# Patient Record
Sex: FEMALE | Race: ASIAN | NOT HISPANIC OR LATINO
[De-identification: names, ages, dates, MRNs, and addresses within clinical notes are randomized per-mention and may not be internally consistent; named-entity substitution may affect disease eponyms.]

---

## 2017-05-09 PROBLEM — Z00.00 ENCOUNTER FOR PREVENTIVE HEALTH EXAMINATION: Status: ACTIVE | Noted: 2017-05-09

## 2017-05-16 ENCOUNTER — APPOINTMENT (OUTPATIENT)
Dept: OBGYN | Facility: CLINIC | Age: 27
End: 2017-05-16

## 2017-05-25 ENCOUNTER — APPOINTMENT (OUTPATIENT)
Dept: INTERNAL MEDICINE | Facility: CLINIC | Age: 27
End: 2017-05-25

## 2021-04-22 ENCOUNTER — LABORATORY RESULT (OUTPATIENT)
Age: 31
End: 2021-04-22

## 2021-04-22 ENCOUNTER — NON-APPOINTMENT (OUTPATIENT)
Age: 31
End: 2021-04-22

## 2021-04-22 ENCOUNTER — ASOB RESULT (OUTPATIENT)
Age: 31
End: 2021-04-22

## 2021-04-22 ENCOUNTER — APPOINTMENT (OUTPATIENT)
Dept: OBGYN | Facility: CLINIC | Age: 31
End: 2021-04-22
Payer: COMMERCIAL

## 2021-04-22 VITALS
BODY MASS INDEX: 19.72 KG/M2 | HEIGHT: 66 IN | SYSTOLIC BLOOD PRESSURE: 118 MMHG | WEIGHT: 122.7 LBS | HEART RATE: 87 BPM | DIASTOLIC BLOOD PRESSURE: 73 MMHG | TEMPERATURE: 98.7 F

## 2021-04-22 DIAGNOSIS — Z80.8 FAMILY HISTORY OF MALIGNANT NEOPLASM OF OTHER ORGANS OR SYSTEMS: ICD-10-CM

## 2021-04-22 DIAGNOSIS — Z78.9 OTHER SPECIFIED HEALTH STATUS: ICD-10-CM

## 2021-04-22 DIAGNOSIS — Z80.1 FAMILY HISTORY OF MALIGNANT NEOPLASM OF TRACHEA, BRONCHUS AND LUNG: ICD-10-CM

## 2021-04-22 DIAGNOSIS — Z87.898 PERSONAL HISTORY OF OTHER SPECIFIED CONDITIONS: ICD-10-CM

## 2021-04-22 PROCEDURE — 99202 OFFICE O/P NEW SF 15 MIN: CPT

## 2021-04-22 PROCEDURE — 99072 ADDL SUPL MATRL&STAF TM PHE: CPT

## 2021-04-22 PROCEDURE — 0501F PRENATAL FLOW SHEET: CPT

## 2021-04-22 PROCEDURE — 76801 OB US < 14 WKS SINGLE FETUS: CPT

## 2021-04-28 ENCOUNTER — LABORATORY RESULT (OUTPATIENT)
Age: 31
End: 2021-04-28

## 2021-04-28 ENCOUNTER — ASOB RESULT (OUTPATIENT)
Age: 31
End: 2021-04-28

## 2021-04-28 ENCOUNTER — APPOINTMENT (OUTPATIENT)
Dept: ANTEPARTUM | Facility: CLINIC | Age: 31
End: 2021-04-28
Payer: COMMERCIAL

## 2021-04-28 PROCEDURE — 36416 COLLJ CAPILLARY BLOOD SPEC: CPT

## 2021-04-28 PROCEDURE — 76801 OB US < 14 WKS SINGLE FETUS: CPT

## 2021-04-28 PROCEDURE — 99072 ADDL SUPL MATRL&STAF TM PHE: CPT

## 2021-04-28 PROCEDURE — 76813 OB US NUCHAL MEAS 1 GEST: CPT | Mod: 59

## 2021-05-03 ENCOUNTER — NON-APPOINTMENT (OUTPATIENT)
Age: 31
End: 2021-05-03

## 2021-05-04 ENCOUNTER — APPOINTMENT (OUTPATIENT)
Dept: OBGYN | Facility: CLINIC | Age: 31
End: 2021-05-04
Payer: COMMERCIAL

## 2021-05-04 PROCEDURE — 76801 OB US < 14 WKS SINGLE FETUS: CPT

## 2021-05-04 PROCEDURE — 99072 ADDL SUPL MATRL&STAF TM PHE: CPT

## 2021-05-07 ENCOUNTER — NON-APPOINTMENT (OUTPATIENT)
Age: 31
End: 2021-05-07

## 2021-05-13 ENCOUNTER — NON-APPOINTMENT (OUTPATIENT)
Age: 31
End: 2021-05-13

## 2021-05-24 ENCOUNTER — NON-APPOINTMENT (OUTPATIENT)
Age: 31
End: 2021-05-24

## 2021-05-24 ENCOUNTER — APPOINTMENT (OUTPATIENT)
Dept: OBGYN | Facility: CLINIC | Age: 31
End: 2021-05-24
Payer: COMMERCIAL

## 2021-05-24 ENCOUNTER — ASOB RESULT (OUTPATIENT)
Age: 31
End: 2021-05-24

## 2021-05-24 VITALS
SYSTOLIC BLOOD PRESSURE: 99 MMHG | DIASTOLIC BLOOD PRESSURE: 63 MMHG | WEIGHT: 125 LBS | HEIGHT: 66 IN | TEMPERATURE: 97.3 F | BODY MASS INDEX: 20.09 KG/M2

## 2021-05-24 PROCEDURE — 76817 TRANSVAGINAL US OBSTETRIC: CPT

## 2021-05-24 PROCEDURE — 0502F SUBSEQUENT PRENATAL CARE: CPT

## 2021-05-24 PROCEDURE — 76815 OB US LIMITED FETUS(S): CPT

## 2021-05-28 ENCOUNTER — NON-APPOINTMENT (OUTPATIENT)
Age: 31
End: 2021-05-28

## 2021-05-28 PROBLEM — Z80.8 FAMILY HISTORY OF MALIGNANT NEOPLASM OF THYROID: Status: ACTIVE | Noted: 2021-05-28

## 2021-05-28 PROBLEM — Z80.1 FAMILY HISTORY OF LUNG CANCER: Status: ACTIVE | Noted: 2021-05-28

## 2021-05-28 PROBLEM — Z87.898 HISTORY OF HEADACHE: Status: RESOLVED | Noted: 2021-05-28 | Resolved: 2021-05-28

## 2021-05-28 LAB
2ND TRIMESTER DATA: NORMAL
ABO + RH PNL BLD: NORMAL
AFP PNL SERPL: NORMAL
AFP SERPL-ACNC: NORMAL
AR GENE MUT ANL BLD/T: NORMAL
B19V IGG SER QL IA: 0.28 INDEX
B19V IGG+IGM SER-IMP: NEGATIVE
B19V IGG+IGM SER-IMP: NORMAL
B19V IGM FLD-ACNC: 0.15 INDEX
B19V IGM SER-ACNC: NEGATIVE
BACTERIA UR CULT: NORMAL
BASOPHILS # BLD AUTO: 0.07 K/UL
BASOPHILS NFR BLD AUTO: 0.7 %
BLD GP AB SCN SERPL QL: NORMAL
CLINICAL BIOCHEMIST REVIEW: NORMAL
COVID-19 NUCLEOCAPSID  GAM ANTIBODY INTERPRETATION: NEGATIVE
EOSINOPHIL # BLD AUTO: 0.18 K/UL
EOSINOPHIL NFR BLD AUTO: 1.8 %
FMR1 GENE MUT ANL BLD/T: NORMAL
HBV SURFACE AG SER QL: NONREACTIVE
HCT VFR BLD CALC: 35.3 %
HCV AB SER QL: NONREACTIVE
HCV S/CO RATIO: 0.14 S/CO
HGB A MFR BLD: 97.7 %
HGB A2 MFR BLD: 2.3 %
HGB BLD-MCNC: 11.5 G/DL
HGB FRACT BLD-IMP: NORMAL
HIV1+2 AB SPEC QL IA.RAPID: NONREACTIVE
IMM GRANULOCYTES NFR BLD AUTO: 0.4 %
LEAD BLD-MCNC: <1 UG/DL
LYMPHOCYTES # BLD AUTO: 1.88 K/UL
LYMPHOCYTES NFR BLD AUTO: 18.3 %
MAN DIFF?: NORMAL
MCHC RBC-ENTMCNC: 26 PG
MCHC RBC-ENTMCNC: 32.6 GM/DL
MCV RBC AUTO: 79.9 FL
MEV IGG FLD QL IA: 36.9 AU/ML
MEV IGG+IGM SER-IMP: POSITIVE
MONOCYTES # BLD AUTO: 0.54 K/UL
MONOCYTES NFR BLD AUTO: 5.3 %
NEUTROPHILS # BLD AUTO: 7.56 K/UL
NEUTROPHILS NFR BLD AUTO: 73.5 %
NOTES NTD: NORMAL
PLATELET # BLD AUTO: 248 K/UL
RBC # BLD: 4.42 M/UL
RBC # FLD: 20.1 %
SARS-COV-2 AB SERPL QL IA: 0.08 INDEX
T GONDII AB SER-IMP: NEGATIVE
T GONDII IGM SER QL: <3 AU/ML
T PALLIDUM AB SER QL IA: NEGATIVE
VZV AB TITR SER: POSITIVE
VZV IGG SER IF-ACNC: 615.6 INDEX
WBC # FLD AUTO: 10.27 K/UL

## 2021-05-28 RX ORDER — VITAMIN A, ASCORBIC ACID, VITAMIN D, .ALPHA.-TOCOPHEROL, THIAMINE MONONITRATE, RIBOFLAVIN, NIACIN, PYRIDOXINE HYDROCHLORIDE, FOLIC ACID, CYANOCOBALAMIN, CALCIUM, IRON, MAGNESIUM, ZINC, COPPER, AND DOCONEXENT 65-1-250MG
KIT ORAL
Refills: 0 | Status: ACTIVE | COMMUNITY

## 2021-05-29 ENCOUNTER — OUTPATIENT (OUTPATIENT)
Dept: INPATIENT UNIT | Facility: HOSPITAL | Age: 31
LOS: 1 days | Discharge: HOME | End: 2021-05-29
Payer: COMMERCIAL

## 2021-05-29 VITALS — DIASTOLIC BLOOD PRESSURE: 65 MMHG | SYSTOLIC BLOOD PRESSURE: 121 MMHG | HEART RATE: 105 BPM

## 2021-05-29 DIAGNOSIS — O26.899 OTHER SPECIFIED PREGNANCY RELATED CONDITIONS, UNSPECIFIED TRIMESTER: ICD-10-CM

## 2021-05-29 DIAGNOSIS — Z3A.00 WEEKS OF GESTATION OF PREGNANCY NOT SPECIFIED: ICD-10-CM

## 2021-05-29 DIAGNOSIS — Z98.891 HISTORY OF UTERINE SCAR FROM PREVIOUS SURGERY: Chronic | ICD-10-CM

## 2021-05-29 PROCEDURE — 99215 OFFICE O/P EST HI 40 MIN: CPT

## 2021-05-30 VITALS — SYSTOLIC BLOOD PRESSURE: 93 MMHG | HEART RATE: 82 BPM | DIASTOLIC BLOOD PRESSURE: 51 MMHG

## 2021-05-30 LAB
APPEARANCE UR: CLEAR — SIGNIFICANT CHANGE UP
BACTERIA # UR AUTO: NEGATIVE — SIGNIFICANT CHANGE UP
BASOPHILS # BLD AUTO: 0.05 K/UL — SIGNIFICANT CHANGE UP (ref 0–0.2)
BASOPHILS NFR BLD AUTO: 0.5 % — SIGNIFICANT CHANGE UP (ref 0–2)
BILIRUB UR-MCNC: NEGATIVE — SIGNIFICANT CHANGE UP
COLOR SPEC: COLORLESS — SIGNIFICANT CHANGE UP
DIFF PNL FLD: ABNORMAL
EOSINOPHIL # BLD AUTO: 0.21 K/UL — SIGNIFICANT CHANGE UP (ref 0–0.5)
EOSINOPHIL NFR BLD AUTO: 2.2 % — SIGNIFICANT CHANGE UP (ref 0–6)
EPI CELLS # UR: 4 /HPF — SIGNIFICANT CHANGE UP (ref 0–5)
GLUCOSE UR QL: NEGATIVE — SIGNIFICANT CHANGE UP
HCT VFR BLD CALC: 32.3 % — LOW (ref 34.5–45)
HGB BLD-MCNC: 10.7 G/DL — LOW (ref 11.5–15.5)
HYALINE CASTS # UR AUTO: 1 /LPF — SIGNIFICANT CHANGE UP (ref 0–7)
IANC: 7.28 K/UL — SIGNIFICANT CHANGE UP (ref 1.5–8.5)
IMM GRANULOCYTES NFR BLD AUTO: 0.3 % — SIGNIFICANT CHANGE UP (ref 0–1.5)
KETONES UR-MCNC: NEGATIVE — SIGNIFICANT CHANGE UP
LEUKOCYTE ESTERASE UR-ACNC: NEGATIVE — SIGNIFICANT CHANGE UP
LYMPHOCYTES # BLD AUTO: 1.58 K/UL — SIGNIFICANT CHANGE UP (ref 1–3.3)
LYMPHOCYTES # BLD AUTO: 16.3 % — SIGNIFICANT CHANGE UP (ref 13–44)
MCHC RBC-ENTMCNC: 27.1 PG — SIGNIFICANT CHANGE UP (ref 27–34)
MCHC RBC-ENTMCNC: 33.1 GM/DL — SIGNIFICANT CHANGE UP (ref 32–36)
MCV RBC AUTO: 81.8 FL — SIGNIFICANT CHANGE UP (ref 80–100)
MONOCYTES # BLD AUTO: 0.52 K/UL — SIGNIFICANT CHANGE UP (ref 0–0.9)
MONOCYTES NFR BLD AUTO: 5.4 % — SIGNIFICANT CHANGE UP (ref 2–14)
NEUTROPHILS # BLD AUTO: 7.28 K/UL — SIGNIFICANT CHANGE UP (ref 1.8–7.4)
NEUTROPHILS NFR BLD AUTO: 75.3 % — SIGNIFICANT CHANGE UP (ref 43–77)
NITRITE UR-MCNC: NEGATIVE — SIGNIFICANT CHANGE UP
NRBC # BLD: 0 /100 WBCS — SIGNIFICANT CHANGE UP
NRBC # FLD: 0 K/UL — SIGNIFICANT CHANGE UP
PH UR: 7.5 — SIGNIFICANT CHANGE UP (ref 5–8)
PLATELET # BLD AUTO: 212 K/UL — SIGNIFICANT CHANGE UP (ref 150–400)
PROT UR-MCNC: NEGATIVE — SIGNIFICANT CHANGE UP
RBC # BLD: 3.95 M/UL — SIGNIFICANT CHANGE UP (ref 3.8–5.2)
RBC # FLD: 16.2 % — HIGH (ref 10.3–14.5)
RBC CASTS # UR COMP ASSIST: SIGNIFICANT CHANGE UP /HPF (ref 0–4)
SP GR SPEC: 1.01 — LOW (ref 1.01–1.02)
UROBILINOGEN FLD QL: SIGNIFICANT CHANGE UP
WBC # BLD: 9.67 K/UL — SIGNIFICANT CHANGE UP (ref 3.8–10.5)
WBC # FLD AUTO: 9.67 K/UL — SIGNIFICANT CHANGE UP (ref 3.8–10.5)
WBC UR QL: 1 /HPF — SIGNIFICANT CHANGE UP (ref 0–5)

## 2021-05-30 NOTE — OB PROVIDER TRIAGE NOTE - NSOBPROVIDERNOTE_OBGYN_ALL_OB_FT
Discussed findings with Dr. Ramirez. Pt. d/c'd home. Pt. to follow up with next OB appointment. Pt. instructed to return to triage with increase abdominal cramping, leakage of fluid, vaginal bleeding, or decrease fetal movement.

## 2021-05-30 NOTE — OB PROVIDER TRIAGE NOTE - HISTORY OF PRESENT ILLNESS
Pt. is a 31y/o  EGA 17.6wks reports of vaginal spotting last night around 7pm of pink tinged blood then again this morning and this afternoon of dark blood. Pt. states around 10pm noticed bright red. Pt. also reports of mild lower back pain. Pt. states on Monday (21) she had a transvaginal sono and was told she had cervical polyp. Pt. states since the TVS she noticed the vaginal spotting. Pt. denies leakage of fluid. Pt. states she has yet to feel fetal movement.     Blood Type: A Pos.   AP: Denies  Medical Hx: Denies  Surgical Hx:   OBGYN Hx:   Primary  2017 (failure to progress 4cm)  Meds: PNV  NKDA  Pt. is a 31y/o  EGA 17.6wks reports of vaginal spotting last night around 7pm of pink tinged blood then again this morning and this afternoon of dark blood. Pt. states around 10pm noticed bright red. Pt. also reports of mild lower back pain. Pt. states on Monday (21) she had a transvaginal sono and was told she had cervical polyp. Pt. states since the TVS she noticed the vaginal spotting. Pt. denies leakage of fluid. Pt. states she has yet to feel fetal movement.     Blood Type: A Pos.   AP: Denies  Medical Hx: Denies  Surgical Hx:   OBGYN Hx:   Primary  2017 (failure to progress 4cm)  B/l ovarian cysts   Meds: PNV  NKDA

## 2021-05-30 NOTE — OB PROVIDER TRIAGE NOTE - NSHPPHYSICALEXAM_GEN_ALL_CORE
ICU Vital Signs Last 24 Hrs  T(C): 36.8 (29 May 2021 23:55), Max: 36.8 (29 May 2021 23:55)  T(F): 98.2 (29 May 2021 23:55), Max: 98.2 (29 May 2021 23:55)  HR: 82 (30 May 2021 02:07) (82 - 105)  BP: 93/51 (30 May 2021 02:07) (93/51 - 121/65)  BP(mean): --  ABP: --  ABP(mean): --  RR: 18 (29 May 2021 23:55) (18 - 18)  SpO2: --    Abdomen soft nontender  TAS: Breech presentation, posterior placenta, EFW: 155bpm, MVP: 5.08, EFW:240.29gms   Speculum: Cervix appears closed. No active bleeding noted. Small cervical polyp noted at cervical os. Old blood removed with one swab   Pt. refused TVS

## 2021-06-17 ENCOUNTER — ASOB RESULT (OUTPATIENT)
Age: 31
End: 2021-06-17

## 2021-06-17 ENCOUNTER — APPOINTMENT (OUTPATIENT)
Dept: ANTEPARTUM | Facility: CLINIC | Age: 31
End: 2021-06-17
Payer: COMMERCIAL

## 2021-06-17 PROCEDURE — 76805 OB US >/= 14 WKS SNGL FETUS: CPT

## 2021-06-23 ENCOUNTER — APPOINTMENT (OUTPATIENT)
Dept: ANTEPARTUM | Facility: CLINIC | Age: 31
End: 2021-06-23

## 2021-06-28 ENCOUNTER — NON-APPOINTMENT (OUTPATIENT)
Age: 31
End: 2021-06-28

## 2021-06-28 ENCOUNTER — APPOINTMENT (OUTPATIENT)
Dept: OBGYN | Facility: CLINIC | Age: 31
End: 2021-06-28
Payer: COMMERCIAL

## 2021-06-28 VITALS
SYSTOLIC BLOOD PRESSURE: 98 MMHG | HEIGHT: 66 IN | TEMPERATURE: 97.3 F | WEIGHT: 130.31 LBS | BODY MASS INDEX: 20.94 KG/M2 | DIASTOLIC BLOOD PRESSURE: 61 MMHG

## 2021-06-28 PROCEDURE — 0502F SUBSEQUENT PRENATAL CARE: CPT

## 2021-07-14 ENCOUNTER — FORM ENCOUNTER (OUTPATIENT)
Age: 31
End: 2021-07-14

## 2021-07-18 ENCOUNTER — FORM ENCOUNTER (OUTPATIENT)
Age: 31
End: 2021-07-18

## 2021-07-19 ENCOUNTER — APPOINTMENT (OUTPATIENT)
Dept: OBGYN | Facility: CLINIC | Age: 31
End: 2021-07-19
Payer: COMMERCIAL

## 2021-07-19 ENCOUNTER — FORM ENCOUNTER (OUTPATIENT)
Age: 31
End: 2021-07-19

## 2021-07-19 PROCEDURE — 59426 ANTEPARTUM CARE ONLY: CPT

## 2021-07-19 PROCEDURE — 0500F INITIAL PRENATAL CARE VISIT: CPT

## 2021-07-26 ENCOUNTER — APPOINTMENT (OUTPATIENT)
Dept: OBGYN | Facility: CLINIC | Age: 31
End: 2021-07-26

## 2021-08-02 ENCOUNTER — APPOINTMENT (OUTPATIENT)
Dept: OBGYN | Facility: CLINIC | Age: 31
End: 2021-08-02
Payer: COMMERCIAL

## 2021-08-02 PROCEDURE — 0502F SUBSEQUENT PRENATAL CARE: CPT

## 2021-08-02 PROCEDURE — 36415 COLL VENOUS BLD VENIPUNCTURE: CPT

## 2021-08-03 ENCOUNTER — FORM ENCOUNTER (OUTPATIENT)
Age: 31
End: 2021-08-03

## 2021-08-24 ENCOUNTER — APPOINTMENT (OUTPATIENT)
Dept: OBGYN | Facility: CLINIC | Age: 31
End: 2021-08-24
Payer: COMMERCIAL

## 2021-08-24 VITALS
SYSTOLIC BLOOD PRESSURE: 110 MMHG | HEIGHT: 63 IN | WEIGHT: 135 LBS | DIASTOLIC BLOOD PRESSURE: 64 MMHG | BODY MASS INDEX: 23.92 KG/M2

## 2021-08-24 PROCEDURE — 0502F SUBSEQUENT PRENATAL CARE: CPT

## 2021-09-02 ENCOUNTER — ASOB RESULT (OUTPATIENT)
Age: 31
End: 2021-09-02

## 2021-09-02 ENCOUNTER — FORM ENCOUNTER (OUTPATIENT)
Age: 31
End: 2021-09-02

## 2021-09-02 ENCOUNTER — APPOINTMENT (OUTPATIENT)
Dept: ANTEPARTUM | Facility: CLINIC | Age: 31
End: 2021-09-02
Payer: COMMERCIAL

## 2021-09-02 PROCEDURE — 76817 TRANSVAGINAL US OBSTETRIC: CPT

## 2021-09-02 PROCEDURE — 76816 OB US FOLLOW-UP PER FETUS: CPT

## 2021-09-08 NOTE — OB RN TRIAGE NOTE - CURRENT PREGNANCY COMPLICATIONS, OB PROFILE
OT WEEKLY PROGRESS NOTE    Time In: 6694  Time Out: 0731    Subjective: \"The doctor said it was the best one they've seen. \" Pt was agreeable to tx. Pain: No pain expressed. Interdisciplinary Communication: Team Conference    Mobility   Score Comments   Rolling 4: Supervision or touching A Side: Bilateral  SBA   Supine to Sit 4: Supervision or touching A S   Sit to Supine 4: Supervision or touching A S     Activities of Daily Living    Score Comments   Eating 6: Independent    Oral Hyigene 6: Independent    Bathing 4: Supervision or touching A Type of Shower: Bath Pack  Position: Supported sitting   Adaptive  Equipment: N/A  Comments: CGA   Upper Body  Dressing 5: S/U or clean-up assist Items Applied: Pullover  Position: Unsupported Sitting   Lower Body Dressing 4: Supervision or touching A Items Applied: Underwear and Elastic pants  Position: Unsupported Sitting  Adaptive Equipment: N/A  Comments: A to adjust waist   Donning/[place] Footwear 3: Partial/Moderate A Items Applied: Socks and Shoes with fasteners   Adaptive Equipment: N/A  Comments: A with shoe   Toilet Transfer 4: Supervision or touching A Transfer Type: SPT   Equipment: N/A   Comments: cuing   Toileting Hygiene 4: Supervision or touching A Comments: Cueing   Education  benefits of OT, energy conservation, breathing techniques and safety awareness       Plan of Care: Please see Care Plan for updated LTGs. Family Training: Scheduled for Thursday 9/9/2021 with family. Summary of Progress: Pt has demonstrated good progress over LOS. Pt continues to benefit from verbal cuing secondary to safety. Pt will need 24/7 supervision secondary to safety. Recommend home health occupational therapy. Pt is scheduled to discharge 9/15/2021. Summary of Session: Pt demonstrated good in OT session. Pt's performance with ADL is reflected in above chart. Verbal cuing to use good breathing techniques and to remove pressure from L foot.  Pt was left supine in bed with all needs met and call bell within reach.      Sukhdev Mark MS OTR/L  9/8/2021 Gestational Age less than 36 Weeks

## 2021-09-16 ENCOUNTER — FORM ENCOUNTER (OUTPATIENT)
Age: 31
End: 2021-09-16

## 2021-09-17 ENCOUNTER — APPOINTMENT (OUTPATIENT)
Dept: OBGYN | Facility: CLINIC | Age: 31
End: 2021-09-17
Payer: COMMERCIAL

## 2021-09-17 VITALS
WEIGHT: 140 LBS | HEIGHT: 63 IN | DIASTOLIC BLOOD PRESSURE: 66 MMHG | BODY MASS INDEX: 24.8 KG/M2 | SYSTOLIC BLOOD PRESSURE: 104 MMHG

## 2021-09-17 PROCEDURE — 0502F SUBSEQUENT PRENATAL CARE: CPT

## 2021-09-29 ENCOUNTER — NON-APPOINTMENT (OUTPATIENT)
Age: 31
End: 2021-09-29

## 2021-09-29 DIAGNOSIS — E04.2 NONTOXIC MULTINODULAR GOITER: ICD-10-CM

## 2021-09-29 RX ORDER — VITAMIN A ACETATE, ASCORBIC ACID, CHOLECALCIFEROL, ALPHA-TOCOPHEROL ACETATE, DL, THIAMINE MONONITRATE, RIBOFLAVIN, NIACINAMIDE, PYRIDOXINE HYDROCHLORIDE, FOLIC ACID, CYANOCOBALAMIN, BIOTIN, CALCIUM PANTOTHENATE, CALCIUM CARBONATE, FERROUS FUMARATE, POTASSIUM IODIDE, MAGNESIUM OXIDE, ZINC OXIDE AND CUPRIC OXIDE 2500; 80; 400; 10; 3; 3.4; 20; 20; 1; 12; 300; 6; 120; 27; 150; 30; 15; 2 [IU]/1; MG/1; [IU]/1; [IU]/1; MG/1; MG/1; MG/1; MG/1; MG/1; UG/1; UG/1; MG/1; MG/1; MG/1; UG/1; UG/1; UG/1; MG/1
TABLET, FILM COATED ORAL
Refills: 0 | Status: ACTIVE | COMMUNITY

## 2021-09-30 ENCOUNTER — APPOINTMENT (OUTPATIENT)
Dept: ANTEPARTUM | Facility: CLINIC | Age: 31
End: 2021-09-30
Payer: COMMERCIAL

## 2021-09-30 ENCOUNTER — ASOB RESULT (OUTPATIENT)
Age: 31
End: 2021-09-30

## 2021-09-30 PROCEDURE — 76819 FETAL BIOPHYS PROFIL W/O NST: CPT

## 2021-10-05 ENCOUNTER — FORM ENCOUNTER (OUTPATIENT)
Age: 31
End: 2021-10-05

## 2021-10-06 ENCOUNTER — TRANSCRIPTION ENCOUNTER (OUTPATIENT)
Age: 31
End: 2021-10-06

## 2021-10-06 ENCOUNTER — FORM ENCOUNTER (OUTPATIENT)
Age: 31
End: 2021-10-06

## 2021-10-06 ENCOUNTER — APPOINTMENT (OUTPATIENT)
Dept: OBGYN | Facility: CLINIC | Age: 31
End: 2021-10-06
Payer: COMMERCIAL

## 2021-10-06 PROCEDURE — 0502F SUBSEQUENT PRENATAL CARE: CPT

## 2021-10-09 ENCOUNTER — FORM ENCOUNTER (OUTPATIENT)
Age: 31
End: 2021-10-09

## 2021-10-13 ENCOUNTER — APPOINTMENT (OUTPATIENT)
Dept: OBGYN | Facility: CLINIC | Age: 31
End: 2021-10-13
Payer: COMMERCIAL

## 2021-10-13 PROCEDURE — 0502F SUBSEQUENT PRENATAL CARE: CPT

## 2021-10-18 ENCOUNTER — APPOINTMENT (OUTPATIENT)
Dept: OBGYN | Facility: CLINIC | Age: 31
End: 2021-10-18

## 2021-10-18 ENCOUNTER — OUTPATIENT (OUTPATIENT)
Dept: OUTPATIENT SERVICES | Facility: HOSPITAL | Age: 31
LOS: 1 days | End: 2021-10-18
Payer: COMMERCIAL

## 2021-10-18 VITALS
SYSTOLIC BLOOD PRESSURE: 98 MMHG | WEIGHT: 145.06 LBS | RESPIRATION RATE: 16 BRPM | TEMPERATURE: 98 F | DIASTOLIC BLOOD PRESSURE: 66 MMHG | HEIGHT: 63 IN | HEART RATE: 92 BPM | OXYGEN SATURATION: 98 %

## 2021-10-18 DIAGNOSIS — O34.211 MATERNAL CARE FOR LOW TRANSVERSE SCAR FROM PREVIOUS CESAREAN DELIVERY: ICD-10-CM

## 2021-10-18 DIAGNOSIS — Z01.818 ENCOUNTER FOR OTHER PREPROCEDURAL EXAMINATION: ICD-10-CM

## 2021-10-18 DIAGNOSIS — Z98.891 HISTORY OF UTERINE SCAR FROM PREVIOUS SURGERY: ICD-10-CM

## 2021-10-18 DIAGNOSIS — Z98.891 HISTORY OF UTERINE SCAR FROM PREVIOUS SURGERY: Chronic | ICD-10-CM

## 2021-10-18 LAB
ANION GAP SERPL CALC-SCNC: 13 MMOL/L — SIGNIFICANT CHANGE UP (ref 5–17)
BLD GP AB SCN SERPL QL: NEGATIVE — SIGNIFICANT CHANGE UP
BUN SERPL-MCNC: 7 MG/DL — SIGNIFICANT CHANGE UP (ref 7–23)
CALCIUM SERPL-MCNC: 8.8 MG/DL — SIGNIFICANT CHANGE UP (ref 8.4–10.5)
CHLORIDE SERPL-SCNC: 104 MMOL/L — SIGNIFICANT CHANGE UP (ref 96–108)
CO2 SERPL-SCNC: 20 MMOL/L — LOW (ref 22–31)
CREAT SERPL-MCNC: 0.35 MG/DL — LOW (ref 0.5–1.3)
GLUCOSE SERPL-MCNC: 68 MG/DL — LOW (ref 70–99)
HCT VFR BLD CALC: 33.2 % — LOW (ref 34.5–45)
HGB BLD-MCNC: 10.5 G/DL — LOW (ref 11.5–15.5)
MCHC RBC-ENTMCNC: 24.9 PG — LOW (ref 27–34)
MCHC RBC-ENTMCNC: 31.6 GM/DL — LOW (ref 32–36)
MCV RBC AUTO: 78.9 FL — LOW (ref 80–100)
NRBC # BLD: 0 /100 WBCS — SIGNIFICANT CHANGE UP (ref 0–0)
PLATELET # BLD AUTO: 204 K/UL — SIGNIFICANT CHANGE UP (ref 150–400)
POTASSIUM SERPL-MCNC: 4.6 MMOL/L — SIGNIFICANT CHANGE UP (ref 3.5–5.3)
POTASSIUM SERPL-SCNC: 4.6 MMOL/L — SIGNIFICANT CHANGE UP (ref 3.5–5.3)
RBC # BLD: 4.21 M/UL — SIGNIFICANT CHANGE UP (ref 3.8–5.2)
RBC # FLD: 15.6 % — HIGH (ref 10.3–14.5)
RH IG SCN BLD-IMP: POSITIVE — SIGNIFICANT CHANGE UP
SODIUM SERPL-SCNC: 137 MMOL/L — SIGNIFICANT CHANGE UP (ref 135–145)
WBC # BLD: 10.24 K/UL — SIGNIFICANT CHANGE UP (ref 3.8–10.5)
WBC # FLD AUTO: 10.24 K/UL — SIGNIFICANT CHANGE UP (ref 3.8–10.5)

## 2021-10-18 PROCEDURE — 86900 BLOOD TYPING SEROLOGIC ABO: CPT

## 2021-10-18 PROCEDURE — 86850 RBC ANTIBODY SCREEN: CPT

## 2021-10-18 PROCEDURE — 85027 COMPLETE CBC AUTOMATED: CPT

## 2021-10-18 PROCEDURE — G0463: CPT

## 2021-10-18 PROCEDURE — 86901 BLOOD TYPING SEROLOGIC RH(D): CPT

## 2021-10-18 PROCEDURE — 80048 BASIC METABOLIC PNL TOTAL CA: CPT

## 2021-10-18 RX ORDER — SODIUM CHLORIDE 9 MG/ML
1000 INJECTION, SOLUTION INTRAVENOUS
Refills: 0 | Status: DISCONTINUED | OUTPATIENT
Start: 2021-10-28 | End: 2021-10-28

## 2021-10-18 NOTE — OB PST NOTE - NSANTHOSAYNRD_GEN_A_CORE
No. SURAJ screening performed.  STOP BANG Legend: 0-2 = LOW Risk; 3-4 = INTERMEDIATE Risk; 5-8 = HIGH Risk

## 2021-10-18 NOTE — OB PST NOTE - HISTORY OF PRESENT ILLNESS
32 yo F  presenting @ 38 weeks of gestation presenting for repeat  on10/28/21. Denies any fever, chills, recent travel or Covid 19 related infections  Received 2 doses of Covid vaccine    **Covid 19 PCR on 10/26/21 @ [facility]

## 2021-10-18 NOTE — OB PST NOTE - NSHPPHYSICALEXAM_GEN_ALL_CORE
PHYSICAL EXAM:      Constitutional: Moderately built female, not in any distress    Eyes: PERRLA    ENMT: WNL, MP2    Neck: supple, no JVD    Breasts: not examined    Back: WNL    Respiratory: CTA bilaterally    Cardiovascular: s1S2 reg, no M/T/R    Gastrointestinal: soft , + BS    Genitourinary: not examined    Rectal: not examined    Extremities: no edema, + PP    Vascular: WNL    Neurological: Alert  O x 3     Skin: W&D    Lymph Nodes: none palpable    Musculoskeletal: WNL    Psychiatric: anxious

## 2021-10-20 PROBLEM — N83.209 UNSPECIFIED OVARIAN CYST, UNSPECIFIED SIDE: Chronic | Status: ACTIVE | Noted: 2021-10-18

## 2021-10-25 ENCOUNTER — LABORATORY RESULT (OUTPATIENT)
Age: 31
End: 2021-10-25

## 2021-10-25 ENCOUNTER — APPOINTMENT (OUTPATIENT)
Dept: DISASTER EMERGENCY | Facility: CLINIC | Age: 31
End: 2021-10-25

## 2021-10-26 ENCOUNTER — APPOINTMENT (OUTPATIENT)
Dept: OBGYN | Facility: CLINIC | Age: 31
End: 2021-10-26
Payer: COMMERCIAL

## 2021-10-26 PROCEDURE — 0502F SUBSEQUENT PRENATAL CARE: CPT

## 2021-10-27 ENCOUNTER — TRANSCRIPTION ENCOUNTER (OUTPATIENT)
Age: 31
End: 2021-10-27

## 2021-10-28 ENCOUNTER — INPATIENT (INPATIENT)
Facility: HOSPITAL | Age: 31
LOS: 2 days | Discharge: HOME | End: 2021-10-31
Attending: OBSTETRICS & GYNECOLOGY | Admitting: OBSTETRICS & GYNECOLOGY
Payer: COMMERCIAL

## 2021-10-28 VITALS
TEMPERATURE: 98 F | DIASTOLIC BLOOD PRESSURE: 63 MMHG | RESPIRATION RATE: 18 BRPM | SYSTOLIC BLOOD PRESSURE: 102 MMHG | HEART RATE: 93 BPM | HEIGHT: 63 IN | WEIGHT: 145.06 LBS

## 2021-10-28 DIAGNOSIS — Z98.891 HISTORY OF UTERINE SCAR FROM PREVIOUS SURGERY: Chronic | ICD-10-CM

## 2021-10-28 DIAGNOSIS — O34.211 MATERNAL CARE FOR LOW TRANSVERSE SCAR FROM PREVIOUS CESAREAN DELIVERY: ICD-10-CM

## 2021-10-28 LAB
BLD GP AB SCN SERPL QL: NEGATIVE — SIGNIFICANT CHANGE UP
COVID-19 SPIKE DOMAIN AB INTERP: POSITIVE
COVID-19 SPIKE DOMAIN ANTIBODY RESULT: >250 U/ML — HIGH
RH IG SCN BLD-IMP: POSITIVE — SIGNIFICANT CHANGE UP
SARS-COV-2 IGG+IGM SERPL QL IA: >250 U/ML — HIGH
SARS-COV-2 IGG+IGM SERPL QL IA: POSITIVE
T PALLIDUM AB TITR SER: NEGATIVE — SIGNIFICANT CHANGE UP

## 2021-10-28 PROCEDURE — 88305 TISSUE EXAM BY PATHOLOGIST: CPT | Mod: 26

## 2021-10-28 PROCEDURE — 59515 CESAREAN DELIVERY: CPT

## 2021-10-28 PROCEDURE — 58925 REMOVAL OF OVARIAN CYST(S): CPT

## 2021-10-28 RX ORDER — NALOXONE HYDROCHLORIDE 4 MG/.1ML
0.1 SPRAY NASAL
Refills: 0 | Status: DISCONTINUED | OUTPATIENT
Start: 2021-10-28 | End: 2021-10-29

## 2021-10-28 RX ORDER — IBUPROFEN 200 MG
600 TABLET ORAL EVERY 6 HOURS
Refills: 0 | Status: COMPLETED | OUTPATIENT
Start: 2021-10-28 | End: 2022-09-26

## 2021-10-28 RX ORDER — OXYCODONE HYDROCHLORIDE 5 MG/1
5 TABLET ORAL ONCE
Refills: 0 | Status: DISCONTINUED | OUTPATIENT
Start: 2021-10-28 | End: 2021-10-31

## 2021-10-28 RX ORDER — CEFAZOLIN SODIUM 1 G
2000 VIAL (EA) INJECTION ONCE
Refills: 0 | Status: COMPLETED | OUTPATIENT
Start: 2021-10-28 | End: 2021-10-28

## 2021-10-28 RX ORDER — NALBUPHINE HYDROCHLORIDE 10 MG/ML
2.5 INJECTION, SOLUTION INTRAMUSCULAR; INTRAVENOUS; SUBCUTANEOUS EVERY 6 HOURS
Refills: 0 | Status: DISCONTINUED | OUTPATIENT
Start: 2021-10-28 | End: 2021-10-29

## 2021-10-28 RX ORDER — CITRIC ACID/SODIUM CITRATE 300-500 MG
15 SOLUTION, ORAL ORAL ONCE
Refills: 0 | Status: COMPLETED | OUTPATIENT
Start: 2021-10-28 | End: 2021-10-28

## 2021-10-28 RX ORDER — MORPHINE SULFATE 50 MG/1
0.1 CAPSULE, EXTENDED RELEASE ORAL ONCE
Refills: 0 | Status: DISCONTINUED | OUTPATIENT
Start: 2021-10-28 | End: 2021-10-29

## 2021-10-28 RX ORDER — OXYCODONE HYDROCHLORIDE 5 MG/1
5 TABLET ORAL
Refills: 0 | Status: DISCONTINUED | OUTPATIENT
Start: 2021-10-28 | End: 2021-10-31

## 2021-10-28 RX ORDER — OXYCODONE HYDROCHLORIDE 5 MG/1
5 TABLET ORAL
Refills: 0 | Status: DISCONTINUED | OUTPATIENT
Start: 2021-10-28 | End: 2021-10-29

## 2021-10-28 RX ORDER — TETANUS TOXOID, REDUCED DIPHTHERIA TOXOID AND ACELLULAR PERTUSSIS VACCINE, ADSORBED 5; 2.5; 8; 8; 2.5 [IU]/.5ML; [IU]/.5ML; UG/.5ML; UG/.5ML; UG/.5ML
0.5 SUSPENSION INTRAMUSCULAR ONCE
Refills: 0 | Status: DISCONTINUED | OUTPATIENT
Start: 2021-10-28 | End: 2021-10-31

## 2021-10-28 RX ORDER — HEPARIN SODIUM 5000 [USP'U]/ML
5000 INJECTION INTRAVENOUS; SUBCUTANEOUS EVERY 12 HOURS
Refills: 0 | Status: DISCONTINUED | OUTPATIENT
Start: 2021-10-28 | End: 2021-10-31

## 2021-10-28 RX ORDER — OXYCODONE HYDROCHLORIDE 5 MG/1
10 TABLET ORAL
Refills: 0 | Status: DISCONTINUED | OUTPATIENT
Start: 2021-10-28 | End: 2021-10-29

## 2021-10-28 RX ORDER — SODIUM CHLORIDE 9 MG/ML
1000 INJECTION, SOLUTION INTRAVENOUS
Refills: 0 | Status: DISCONTINUED | OUTPATIENT
Start: 2021-10-28 | End: 2021-10-31

## 2021-10-28 RX ORDER — DIPHENHYDRAMINE HCL 50 MG
25 CAPSULE ORAL EVERY 6 HOURS
Refills: 0 | Status: DISCONTINUED | OUTPATIENT
Start: 2021-10-28 | End: 2021-10-31

## 2021-10-28 RX ORDER — LANOLIN
1 OINTMENT (GRAM) TOPICAL EVERY 6 HOURS
Refills: 0 | Status: DISCONTINUED | OUTPATIENT
Start: 2021-10-28 | End: 2021-10-31

## 2021-10-28 RX ORDER — FAMOTIDINE 10 MG/ML
20 INJECTION INTRAVENOUS ONCE
Refills: 0 | Status: COMPLETED | OUTPATIENT
Start: 2021-10-28 | End: 2021-10-28

## 2021-10-28 RX ORDER — SIMETHICONE 80 MG/1
80 TABLET, CHEWABLE ORAL EVERY 4 HOURS
Refills: 0 | Status: DISCONTINUED | OUTPATIENT
Start: 2021-10-28 | End: 2021-10-31

## 2021-10-28 RX ORDER — KETOROLAC TROMETHAMINE 30 MG/ML
30 SYRINGE (ML) INJECTION EVERY 6 HOURS
Refills: 0 | Status: DISCONTINUED | OUTPATIENT
Start: 2021-10-28 | End: 2021-10-29

## 2021-10-28 RX ORDER — OXYTOCIN 10 UNIT/ML
333.33 VIAL (ML) INJECTION
Qty: 20 | Refills: 0 | Status: COMPLETED | OUTPATIENT
Start: 2021-10-28 | End: 2021-10-28

## 2021-10-28 RX ORDER — MAGNESIUM HYDROXIDE 400 MG/1
30 TABLET, CHEWABLE ORAL
Refills: 0 | Status: DISCONTINUED | OUTPATIENT
Start: 2021-10-28 | End: 2021-10-31

## 2021-10-28 RX ORDER — OXYTOCIN 10 UNIT/ML
333.33 VIAL (ML) INJECTION
Qty: 20 | Refills: 0 | Status: DISCONTINUED | OUTPATIENT
Start: 2021-10-28 | End: 2021-10-31

## 2021-10-28 RX ORDER — ACETAMINOPHEN 500 MG
975 TABLET ORAL
Refills: 0 | Status: DISCONTINUED | OUTPATIENT
Start: 2021-10-28 | End: 2021-10-31

## 2021-10-28 RX ORDER — SODIUM CHLORIDE 9 MG/ML
1000 INJECTION, SOLUTION INTRAVENOUS ONCE
Refills: 0 | Status: COMPLETED | OUTPATIENT
Start: 2021-10-28 | End: 2021-10-28

## 2021-10-28 RX ORDER — INFLUENZA VIRUS VACCINE 15; 15; 15; 15 UG/.5ML; UG/.5ML; UG/.5ML; UG/.5ML
0.5 SUSPENSION INTRAMUSCULAR ONCE
Refills: 0 | Status: DISCONTINUED | OUTPATIENT
Start: 2021-10-28 | End: 2021-10-31

## 2021-10-28 RX ORDER — ONDANSETRON 8 MG/1
4 TABLET, FILM COATED ORAL EVERY 6 HOURS
Refills: 0 | Status: DISCONTINUED | OUTPATIENT
Start: 2021-10-28 | End: 2021-10-29

## 2021-10-28 RX ORDER — DEXAMETHASONE 0.5 MG/5ML
4 ELIXIR ORAL EVERY 6 HOURS
Refills: 0 | Status: DISCONTINUED | OUTPATIENT
Start: 2021-10-28 | End: 2021-10-29

## 2021-10-28 RX ORDER — CHLORHEXIDINE GLUCONATE 213 G/1000ML
1 SOLUTION TOPICAL ONCE
Refills: 0 | Status: COMPLETED | OUTPATIENT
Start: 2021-10-28 | End: 2021-10-28

## 2021-10-28 RX ADMIN — Medication 975 MILLIGRAM(S): at 21:00

## 2021-10-28 RX ADMIN — Medication 15 MILLILITER(S): at 10:36

## 2021-10-28 RX ADMIN — HEPARIN SODIUM 5000 UNIT(S): 5000 INJECTION INTRAVENOUS; SUBCUTANEOUS at 20:21

## 2021-10-28 RX ADMIN — Medication 30 MILLIGRAM(S): at 18:48

## 2021-10-28 RX ADMIN — Medication 975 MILLIGRAM(S): at 15:49

## 2021-10-28 RX ADMIN — Medication 1000 MILLIUNIT(S)/MIN: at 13:32

## 2021-10-28 RX ADMIN — Medication 30 MILLIGRAM(S): at 23:53

## 2021-10-28 RX ADMIN — Medication 30 MILLIGRAM(S): at 18:09

## 2021-10-28 RX ADMIN — CHLORHEXIDINE GLUCONATE 1 APPLICATION(S): 213 SOLUTION TOPICAL at 10:36

## 2021-10-28 RX ADMIN — Medication 975 MILLIGRAM(S): at 20:21

## 2021-10-28 RX ADMIN — Medication 100 MILLIGRAM(S): at 12:00

## 2021-10-28 RX ADMIN — SODIUM CHLORIDE 2000 MILLILITER(S): 9 INJECTION, SOLUTION INTRAVENOUS at 10:36

## 2021-10-28 RX ADMIN — FAMOTIDINE 20 MILLIGRAM(S): 10 INJECTION INTRAVENOUS at 10:36

## 2021-10-28 NOTE — OB RN INTRAOPERATIVE NOTE - NS_ADDITIONALPROCINFO1_OBGYN_ALL_OB_FT
QBL =   urine =  EBL = QBL = 984 ml per Triton  urine = 550 ml of clear yellow urine , out of o/r  EBL = QBL per MD

## 2021-10-28 NOTE — OB PROVIDER DELIVERY SUMMARY - NSSELHIDDEN_OBGYN_ALL_OB_FT
[NS_DeliveryAttending1_OBGYN_ALL_OB_FT:[id number]],[NS_DeliveryAssist1_OBGYN_ALL_OB_FT:[id number]],[NS_DeliveryRN_OBGYN_ALL_OB_FT:[id number]==],[NS_DeliveryAssist2_OBGYN_ALL_OB_FT:[id number]=]

## 2021-10-28 NOTE — OB RN DELIVERY SUMMARY - NS_DELIVERYASSIST1_OBGYN_ALL_OB_FT
Pt presented for perio probings, eval for scaling and finalizing tx planing  Reviewed PMH and no changes  ASA I  Pt reports no dental pain or discomfort  Today perio probing's taken and >3mm pocket depths with bleeding and purulence  Generalized Moderate with localized severe periodontitis  Explained to pt that deep cleaning is needed and in 6 weeks if perio is not improved he might need a second round a deep cleaning or a gum surgery  Pt agreed  Recommended extraction of #18 and #19 since badly decayed on #18 and root caries on #19  Pt agreed  Notified pt of retained root tip on #20 site  Pt doesn't want to do anything since its not bothering him  Caries noted on some other teeth, charted and explained to pt he needs to return for resins  Pt disclosed food is getting trapped on his existing anterior PFM bridge  Explained if bridge gets removed we would not be able to place a new bridge due to abutment teeth not being strong enough  Another options would be partial dentures  Pt decided to keep the existing PFM bridge for as long as it lasts  Oral cancer screening done and no pathology note don ROM, FOM, Palate, soft tissue or tongue  All questions answered  Pt left in good health  NV: ext #18 and #19  NNV: SRP UR/LR  NNNV: SRP UL/LL    FARZAD Shepherd Sydnee Earl MD

## 2021-10-28 NOTE — OB RN INTRAOPERATIVE NOTE - NSSELHIDDEN_OBGYN_ALL_OB_FT
[NS_DeliveryAttending1_OBGYN_ALL_OB_FT:[id number]==],[NS_DeliveryAssist1_OBGYN_ALL_OB_FT:[id number]==],[NS_DeliveryRN_OBGYN_ALL_OB_FT:[id number]==],[NS_DeliveryAssist2_OBGYN_ALL_OB_FT:[id number]=]

## 2021-10-28 NOTE — OB RN DELIVERY SUMMARY - NS_SCRUBTECH_OBGYN_ALL_OB_FT
17 minutes spent discussing advanced care directives above that of which was spent discussing assessment and plan with patient and family and family and pt state he is full code. Sammy Snyedr - CST

## 2021-10-28 NOTE — OB RN DELIVERY SUMMARY - NSSELHIDDEN_OBGYN_ALL_OB_FT
[NS_DeliveryAttending1_OBGYN_ALL_OB_FT:[id number]==],[NS_DeliveryAssist1_OBGYN_ALL_OB_FT:[id number]==],[NS_DeliveryRN_OBGYN_ALL_OB_FT:[id number]==],[NS_DeliveryAssist2_OBGYN_ALL_OB_FT:[id number]=]

## 2021-10-28 NOTE — PRE-ANESTHESIA EVALUATION ADULT - NSANTHPMHFT_GEN_ALL_CORE
primary C/S 2017; FTP/failed induction, epidural w/o cx's  denies signif. [facility]

## 2021-10-28 NOTE — OB RN DELIVERY SUMMARY - NS_SEPSISRSKCALC_OBGYN_ALL_OB_FT
GBS status in the 'Prenatal Lab tests/results section' on the OB RN Patient Profile must be documented.   EOS calculated successfully. EOS Risk Factor: 0.5/1000 live births ([facility] [place] national incidence); GA=39w3d; Temp=97.9; ROM=0; GBS='Negative'; Antibiotics='No antibiotics or any antibiotics < 2 hrs prior to birth'

## 2021-10-28 NOTE — OB NEONATOLOGY/PEDIATRICIAN DELIVERY SUMMARY - NSPEDSNEONOTESA_OBGYN_ALL_OB_FT
Requested by Dr. Parsons to attend this scheduled repeat [place] section born to a 31 y.o.   A+/HIV-/HepBsAg-/RI/Treponema-/GBS-/COVID- woman at 39 3/7 wk GA.  Past ObGyn hx: C/S x1; ovarian cysts. AROM at delivery - clear AF.  Baby emerged cephalic and vigorous. Delayed cord clamping x 30 seconds. Baby brought to warmer, warmed, dried, sx'd and stimulated. HR > 100 bpm with good tone and respiratory effort. Basic resuscitation provided. EOS n/a. Mother desires breastfeeding, Hep B vaccine and a circumcision.

## 2021-10-28 NOTE — PRE-ANESTHESIA EVALUATION ADULT - NSANTHADDINFOFT_GEN_ALL_CORE
CSE, intrathecal duramorph explained to pt, in detail; risks include but not limited to HA, failure, nv injury.  All questions answered.

## 2021-10-28 NOTE — OB PROVIDER DELIVERY SUMMARY - NSPROVIDERDELIVERYNOTE_OBGYN_ALL_OB_FT
Liveborn male infant, vertex presentation, apgars 9/9. B/l ovarian cysts, removed. 4c, L ovarian cyst containing chocolate fluid c/w endometrioma.  L sided 5cm simple cyst with straw colored mucoid contents. EBL 984cc. IVF 1100cc. UOP 550cc.     Sydnee Earl, PGY4 Liveborn male infant, vertex presentation, apgars 9/9. B/l ovarian cysts, removed. 4cm, L ovarian cyst containing chocolate fluid c/w endometrioma.  L sided 5cm simple cyst with straw colored mucoid contents. EBL 984cc. IVF 1100cc. UOP 550cc.     Sydnee Earl, PGY4

## 2021-10-28 NOTE — OB RN PATIENT PROFILE - PROVIDER NOTIFICATION
Pt c/o awakening this am with N/V.  Has had a cold for 3 days.  +Abd pain.  Appears comfortable Declines

## 2021-10-29 LAB
BASOPHILS # BLD AUTO: 0.07 K/UL — SIGNIFICANT CHANGE UP (ref 0–0.2)
BASOPHILS NFR BLD AUTO: 0.5 % — SIGNIFICANT CHANGE UP (ref 0–2)
EOSINOPHIL # BLD AUTO: 0.08 K/UL — SIGNIFICANT CHANGE UP (ref 0–0.5)
EOSINOPHIL NFR BLD AUTO: 0.5 % — SIGNIFICANT CHANGE UP (ref 0–6)
HCT VFR BLD CALC: 26.4 % — LOW (ref 34.5–45)
HGB BLD-MCNC: 8.2 G/DL — LOW (ref 11.5–15.5)
IMM GRANULOCYTES NFR BLD AUTO: 0.7 % — SIGNIFICANT CHANGE UP (ref 0–1.5)
LYMPHOCYTES # BLD AUTO: 18.4 % — SIGNIFICANT CHANGE UP (ref 13–44)
LYMPHOCYTES # BLD AUTO: 2.74 K/UL — SIGNIFICANT CHANGE UP (ref 1–3.3)
MCHC RBC-ENTMCNC: 24.7 PG — LOW (ref 27–34)
MCHC RBC-ENTMCNC: 31.1 GM/DL — LOW (ref 32–36)
MCV RBC AUTO: 79.5 FL — LOW (ref 80–100)
MONOCYTES # BLD AUTO: 0.92 K/UL — HIGH (ref 0–0.9)
MONOCYTES NFR BLD AUTO: 6.2 % — SIGNIFICANT CHANGE UP (ref 2–14)
NEUTROPHILS # BLD AUTO: 11.01 K/UL — HIGH (ref 1.8–7.4)
NEUTROPHILS NFR BLD AUTO: 73.7 % — SIGNIFICANT CHANGE UP (ref 43–77)
NRBC # BLD: 0 /100 WBCS — SIGNIFICANT CHANGE UP (ref 0–0)
PLATELET # BLD AUTO: 174 K/UL — SIGNIFICANT CHANGE UP (ref 150–400)
RBC # BLD: 3.32 M/UL — LOW (ref 3.8–5.2)
RBC # FLD: 16.2 % — HIGH (ref 10.3–14.5)
WBC # BLD: 14.93 K/UL — HIGH (ref 3.8–10.5)
WBC # FLD AUTO: 14.93 K/UL — HIGH (ref 3.8–10.5)

## 2021-10-29 RX ORDER — IBUPROFEN 200 MG
600 TABLET ORAL EVERY 6 HOURS
Refills: 0 | Status: DISCONTINUED | OUTPATIENT
Start: 2021-10-29 | End: 2021-10-31

## 2021-10-29 RX ORDER — FERROUS SULFATE 325(65) MG
325 TABLET ORAL THREE TIMES A DAY
Refills: 0 | Status: DISCONTINUED | OUTPATIENT
Start: 2021-10-29 | End: 2021-10-31

## 2021-10-29 RX ORDER — ASCORBIC ACID 60 MG
500 TABLET,CHEWABLE ORAL THREE TIMES A DAY
Refills: 0 | Status: DISCONTINUED | OUTPATIENT
Start: 2021-10-29 | End: 2021-10-31

## 2021-10-29 RX ADMIN — Medication 975 MILLIGRAM(S): at 14:45

## 2021-10-29 RX ADMIN — Medication 600 MILLIGRAM(S): at 18:05

## 2021-10-29 RX ADMIN — HEPARIN SODIUM 5000 UNIT(S): 5000 INJECTION INTRAVENOUS; SUBCUTANEOUS at 21:30

## 2021-10-29 RX ADMIN — Medication 325 MILLIGRAM(S): at 14:03

## 2021-10-29 RX ADMIN — Medication 30 MILLIGRAM(S): at 06:47

## 2021-10-29 RX ADMIN — Medication 30 MILLIGRAM(S): at 13:00

## 2021-10-29 RX ADMIN — Medication 600 MILLIGRAM(S): at 01:00

## 2021-10-29 RX ADMIN — HEPARIN SODIUM 5000 UNIT(S): 5000 INJECTION INTRAVENOUS; SUBCUTANEOUS at 08:10

## 2021-10-29 RX ADMIN — Medication 975 MILLIGRAM(S): at 08:10

## 2021-10-29 RX ADMIN — Medication 975 MILLIGRAM(S): at 21:25

## 2021-10-29 RX ADMIN — Medication 500 MILLIGRAM(S): at 14:03

## 2021-10-29 RX ADMIN — Medication 500 MILLIGRAM(S): at 21:25

## 2021-10-29 RX ADMIN — Medication 975 MILLIGRAM(S): at 04:22

## 2021-10-29 RX ADMIN — Medication 975 MILLIGRAM(S): at 22:00

## 2021-10-29 RX ADMIN — Medication 30 MILLIGRAM(S): at 12:28

## 2021-10-29 RX ADMIN — Medication 325 MILLIGRAM(S): at 21:25

## 2021-10-29 RX ADMIN — Medication 975 MILLIGRAM(S): at 14:15

## 2021-10-29 RX ADMIN — Medication 975 MILLIGRAM(S): at 03:49

## 2021-10-29 RX ADMIN — Medication 30 MILLIGRAM(S): at 05:54

## 2021-10-29 RX ADMIN — Medication 975 MILLIGRAM(S): at 08:40

## 2021-10-29 RX ADMIN — Medication 30 MILLIGRAM(S): at 00:02

## 2021-10-29 NOTE — PROGRESS NOTE ADULT - SUBJECTIVE AND OBJECTIVE BOX
Day 1 of Anesthesia Pain Management Service    SUBJECTIVE: Doing ok  Pain Scale Score:          [X] Refer to charted pain scores    THERAPY:    s/p    100 mcg PF morphine on 10\28\2021      MEDICATIONS  (STANDING):  acetaminophen     Tablet .. 975 milliGRAM(s) Oral <User Schedule>  ascorbic acid 500 milliGRAM(s) Oral three times a day  diphtheria/tetanus/pertussis (acellular) Vaccine (ADAcel) 0.5 milliLiter(s) IntraMuscular once  ferrous    sulfate 325 milliGRAM(s) Oral three times a day  heparin   Injectable 5000 Unit(s) SubCutaneous every 12 hours  ibuprofen  Tablet. 600 milliGRAM(s) Oral every 6 hours  influenza   Vaccine 0.5 milliLiter(s) IntraMuscular once  ketorolac   Injectable 30 milliGRAM(s) IV Push every 6 hours  lactated ringers. 1000 milliLiter(s) (125 mL/Hr) IV Continuous <Continuous>  morphine PF Spinal 0.1 milliGRAM(s) IntraThecal. once  oxytocin Infusion 333.333 milliUNIT(s)/Min (1000 mL/Hr) IV Continuous <Continuous>    MEDICATIONS  (PRN):  dexAMETHasone  Injectable 4 milliGRAM(s) IV Push every 6 hours PRN Nausea  diphenhydrAMINE 25 milliGRAM(s) Oral every 6 hours PRN Pruritus  lanolin Ointment 1 Application(s) Topical every 6 hours PRN Sore Nipples  magnesium hydroxide Suspension 30 milliLiter(s) Oral two times a day PRN Constipation  nalbuphine Injectable 2.5 milliGRAM(s) IV Push every 6 hours PRN Pruritus  naloxone Injectable 0.1 milliGRAM(s) IV Push every 3 minutes PRN For ANY of the following changes in patient status:  A. Breaths Per Minute LESS THAN 10, B. Oxygen saturation LESS THAN 90%, C. Sedation score of 6 for Stop After: 4 Times  ondansetron Injectable 4 milliGRAM(s) IV Push every 6 hours PRN Nausea  oxyCODONE    IR 5 milliGRAM(s) Oral every 3 hours PRN Mild Pain (1 - 3)  oxyCODONE    IR 10 milliGRAM(s) Oral every 3 hours PRN Moderate Pain (4 - 6)  oxyCODONE    IR 5 milliGRAM(s) Oral every 3 hours PRN Moderate to Severe Pain (4-10)  oxyCODONE    IR 5 milliGRAM(s) Oral once PRN Moderate to Severe Pain (4-10)  simethicone 80 milliGRAM(s) Chew every 4 hours PRN Gas      OBJECTIVE:    Sedation:        	[X] Alert	 [ ] Drowsy	[ ] Arousable      [ ] Asleep       [ ] Unresponsive    Side Effects:	[X] None 	[ ] Nausea	[ ] Vomiting         [ ] Pruritus  		[ ] Weakness            [ ] Numbness	          [ ] Other:    Vital Signs Last 24 Hrs  T(C): 36.8 (29 Oct 2021 08:41), Max: 37.1 (29 Oct 2021 01:00)  T(F): 98.2 (29 Oct 2021 08:41), Max: 98.8 (29 Oct 2021 01:00)  HR: 74 (29 Oct 2021 08:41) (69 - 104)  BP: 90/60 (29 Oct 2021 08:41) (86/71 - 105/52)  BP(mean): 70 (28 Oct 2021 15:25) (66 - 76)  RR: 18 (29 Oct 2021 08:41) (16 - 20)  SpO2: 95% (29 Oct 2021 08:41) (95% - 100%)    ASSESSMENT/ PLAN  [X] Patient transitioned to prn analgesics  [X] Pain management per primary service, pain service to sign off   [X]Documentation and Verification of current medications

## 2021-10-29 NOTE — PROGRESS NOTE ADULT - SUBJECTIVE AND OBJECTIVE BOX
OB Progress Note:  Delivery, POD#1    S: 30yo POD#1 s/p LTCS . Her pain is well controlled. She is tolerating a regular diet and passing flatus. Denies N/V. Denies CP/SOB/lightheadedness/dizziness.  She is ambulating without difficulty.  Voiding spontanously.     O:   Vital Signs Last 24 Hrs  T(C): 37.1 (29 Oct 2021 01:00), Max: 37.1 (29 Oct 2021 01:00)  T(F): 98.8 (29 Oct 2021 01:00), Max: 98.8 (29 Oct 2021 01:00)  HR: 71 (29 Oct 2021 01:00) (69 - 104)  BP: 97/60 (29 Oct 2021 01:00) (86/71 - 105/52)  BP(mean): 70 (28 Oct 2021 15:25) (66 - 76)  RR: 18 (29 Oct 2021 01:00) (16 - 20)  SpO2: 96% (29 Oct 2021 01:00) (96% - 100%)    Labs:  Blood type: A Positive  Rubella IgG: RPR: Negative                    acetaminophen     Tablet .. 975 milliGRAM(s) Oral <User Schedule>  dexAMETHasone  Injectable 4 milliGRAM(s) IV Push every 6 hours PRN  diphenhydrAMINE 25 milliGRAM(s) Oral every 6 hours PRN  diphtheria/tetanus/pertussis (acellular) Vaccine (ADAcel) 0.5 milliLiter(s) IntraMuscular once  heparin   Injectable 5000 Unit(s) SubCutaneous every 12 hours  ibuprofen  Tablet. 600 milliGRAM(s) Oral every 6 hours  influenza   Vaccine 0.5 milliLiter(s) IntraMuscular once  ketorolac   Injectable 30 milliGRAM(s) IV Push every 6 hours  lactated ringers. 1000 milliLiter(s) IV Continuous <Continuous>  lanolin Ointment 1 Application(s) Topical every 6 hours PRN  magnesium hydroxide Suspension 30 milliLiter(s) Oral two times a day PRN  morphine PF Spinal 0.1 milliGRAM(s) IntraThecal. once  nalbuphine Injectable 2.5 milliGRAM(s) IV Push every 6 hours PRN  naloxone Injectable 0.1 milliGRAM(s) IV Push every 3 minutes PRN  ondansetron Injectable 4 milliGRAM(s) IV Push every 6 hours PRN  oxyCODONE    IR 5 milliGRAM(s) Oral every 3 hours PRN  oxyCODONE    IR 10 milliGRAM(s) Oral every 3 hours PRN  oxyCODONE    IR 5 milliGRAM(s) Oral every 3 hours PRN  oxyCODONE    IR 5 milliGRAM(s) Oral once PRN  oxytocin Infusion 333.333 milliUNIT(s)/Min IV Continuous <Continuous>  simethicone 80 milliGRAM(s) Chew every 4 hours PRN      PE:  General: NAD  Abdomen: Mildly distended, appropriately tender, incision c/d/i.  Extremities: No erythema, no pitting edema     OB Progress Note:  Delivery, POD#1    S: 30yo POD#1 s/p LTCS . Her pain is well controlled. She is tolerating a regular diet, but is not yet passing flatus. Denies N/V. Denies CP/SOB/lightheadedness/dizziness.  Pt has not yet gotten up out of bed. Cho removed. Pt is not yet voiding spontaneously but is still not 8 hours post cho catheter removal. .     O:   Vital Signs Last 24 Hrs  T(C): 37.1 (29 Oct 2021 01:00), Max: 37.1 (29 Oct 2021 01:00)  T(F): 98.8 (29 Oct 2021 01:00), Max: 98.8 (29 Oct 2021 01:00)  HR: 71 (29 Oct 2021 01:00) (69 - 104)  BP: 97/60 (29 Oct 2021 01:00) (86/71 - 105/52)  BP(mean): 70 (28 Oct 2021 15:25) (66 - 76)  RR: 18 (29 Oct 2021 01:00) (16 - 20)  SpO2: 96% (29 Oct 2021 01:00) (96% - 100%)    Labs:  Blood type: A Positive  Rubella IgG: RPR: Negative        acetaminophen     Tablet .. 975 milliGRAM(s) Oral <User Schedule>  dexAMETHasone  Injectable 4 milliGRAM(s) IV Push every 6 hours PRN  diphenhydrAMINE 25 milliGRAM(s) Oral every 6 hours PRN  diphtheria/tetanus/pertussis (acellular) Vaccine (ADAcel) 0.5 milliLiter(s) IntraMuscular once  heparin   Injectable 5000 Unit(s) SubCutaneous every 12 hours  ibuprofen  Tablet. 600 milliGRAM(s) Oral every 6 hours  influenza   Vaccine 0.5 milliLiter(s) IntraMuscular once  ketorolac   Injectable 30 milliGRAM(s) IV Push every 6 hours  lactated ringers. 1000 milliLiter(s) IV Continuous <Continuous>  lanolin Ointment 1 Application(s) Topical every 6 hours PRN  magnesium hydroxide Suspension 30 milliLiter(s) Oral two times a day PRN  morphine PF Spinal 0.1 milliGRAM(s) IntraThecal. once  nalbuphine Injectable 2.5 milliGRAM(s) IV Push every 6 hours PRN  naloxone Injectable 0.1 milliGRAM(s) IV Push every 3 minutes PRN  ondansetron Injectable 4 milliGRAM(s) IV Push every 6 hours PRN  oxyCODONE    IR 5 milliGRAM(s) Oral every 3 hours PRN  oxyCODONE    IR 10 milliGRAM(s) Oral every 3 hours PRN  oxyCODONE    IR 5 milliGRAM(s) Oral every 3 hours PRN  oxyCODONE    IR 5 milliGRAM(s) Oral once PRN  oxytocin Infusion 333.333 milliUNIT(s)/Min IV Continuous <Continuous>  simethicone 80 milliGRAM(s) Chew every 4 hours PRN      PE:  General: NAD  Abdomen: Mildly distended, appropriately tender, incision c/d/i.  Extremities: No erythema, no pitting edema

## 2021-10-29 NOTE — CHART NOTE - NSCHARTNOTEFT_GEN_A_CORE
PA NOTE          POD#1         Vital Signs Last 24 Hrs  T(C): 36.8 (29 Oct 2021 08:41), Max: 37.1 (29 Oct 2021 01:00)  T(F): 98.2 (29 Oct 2021 08:41), Max: 98.8 (29 Oct 2021 01:00)  HR: 74 (29 Oct 2021 08:41) (69 - 104)  BP: 90/60 (29 Oct 2021 08:41) (86/71 - 105/52)  BP(mean): 70 (28 Oct 2021 15:25) (66 - 76)  RR: 18 (29 Oct 2021 08:41) (16 - 20)  SpO2: 95% (29 Oct 2021 08:41) (95% - 100%)               8.2    14.93 )-----------( 174      ( 10-29 @ 07:37 )             26.4           Assessment: Anemia secondary to acute blood loss due to delivery    Plan:  - Ferrous Sulfate, Vitamin C supplementation.  - Monitor for signs/symptoms of anemia.   - Does not require transfusion at this time

## 2021-10-30 ENCOUNTER — TRANSCRIPTION ENCOUNTER (OUTPATIENT)
Age: 31
End: 2021-10-30

## 2021-10-30 RX ORDER — ACETAMINOPHEN 500 MG
3 TABLET ORAL
Qty: 0 | Refills: 0 | DISCHARGE
Start: 2021-10-30

## 2021-10-30 RX ORDER — OXYCODONE HYDROCHLORIDE 5 MG/1
1 TABLET ORAL
Qty: 0 | Refills: 0 | DISCHARGE
Start: 2021-10-30

## 2021-10-30 RX ORDER — FERROUS SULFATE 325(65) MG
1 TABLET ORAL
Qty: 0 | Refills: 0 | DISCHARGE
Start: 2021-10-30

## 2021-10-30 RX ORDER — IBUPROFEN 200 MG
1 TABLET ORAL
Qty: 0 | Refills: 0 | DISCHARGE
Start: 2021-10-30

## 2021-10-30 RX ORDER — ASCORBIC ACID 60 MG
1 TABLET,CHEWABLE ORAL
Qty: 0 | Refills: 0 | DISCHARGE
Start: 2021-10-30

## 2021-10-30 RX ORDER — LANOLIN
1 OINTMENT (GRAM) TOPICAL
Qty: 0 | Refills: 0 | DISCHARGE
Start: 2021-10-30

## 2021-10-30 RX ADMIN — Medication 975 MILLIGRAM(S): at 08:35

## 2021-10-30 RX ADMIN — SIMETHICONE 80 MILLIGRAM(S): 80 TABLET, CHEWABLE ORAL at 17:35

## 2021-10-30 RX ADMIN — Medication 600 MILLIGRAM(S): at 12:21

## 2021-10-30 RX ADMIN — Medication 975 MILLIGRAM(S): at 21:06

## 2021-10-30 RX ADMIN — Medication 600 MILLIGRAM(S): at 23:24

## 2021-10-30 RX ADMIN — Medication 325 MILLIGRAM(S): at 15:13

## 2021-10-30 RX ADMIN — Medication 600 MILLIGRAM(S): at 17:35

## 2021-10-30 RX ADMIN — Medication 500 MILLIGRAM(S): at 21:05

## 2021-10-30 RX ADMIN — Medication 325 MILLIGRAM(S): at 21:05

## 2021-10-30 RX ADMIN — Medication 975 MILLIGRAM(S): at 15:45

## 2021-10-30 RX ADMIN — Medication 325 MILLIGRAM(S): at 07:47

## 2021-10-30 RX ADMIN — Medication 500 MILLIGRAM(S): at 07:48

## 2021-10-30 RX ADMIN — Medication 600 MILLIGRAM(S): at 12:50

## 2021-10-30 RX ADMIN — HEPARIN SODIUM 5000 UNIT(S): 5000 INJECTION INTRAVENOUS; SUBCUTANEOUS at 09:07

## 2021-10-30 RX ADMIN — Medication 600 MILLIGRAM(S): at 18:05

## 2021-10-30 RX ADMIN — Medication 975 MILLIGRAM(S): at 08:04

## 2021-10-30 RX ADMIN — Medication 975 MILLIGRAM(S): at 15:13

## 2021-10-30 RX ADMIN — HEPARIN SODIUM 5000 UNIT(S): 5000 INJECTION INTRAVENOUS; SUBCUTANEOUS at 21:06

## 2021-10-30 RX ADMIN — Medication 600 MILLIGRAM(S): at 00:24

## 2021-10-30 RX ADMIN — Medication 500 MILLIGRAM(S): at 15:13

## 2021-10-30 RX ADMIN — Medication 975 MILLIGRAM(S): at 22:01

## 2021-10-30 NOTE — PROGRESS NOTE ADULT - SUBJECTIVE AND OBJECTIVE BOX
OB Progress Note: LTCS, POD#2    S: 31y POD#2 s/p LTCS. Pain is well controlled. She is tolerating a regular diet and passing flatus. She is voiding spontaneously, and ambulating without difficulty. Denies CP/SOB. Denies lightheadedness/dizziness. Denies N/V.    O:  Vitals:  Vital Signs Last 24 Hrs  T(C): 36.9 (30 Oct 2021 05:07), Max: 36.9 (30 Oct 2021 05:07)  T(F): 98.4 (30 Oct 2021 05:07), Max: 98.4 (30 Oct 2021 05:07)  HR: 70 (30 Oct 2021 05:07) (70 - 90)  BP: 95/61 (30 Oct 2021 05:07) (90/60 - 99/60)  BP(mean): --  RR: 18 (30 Oct 2021 05:07) (18 - 18)  SpO2: 95% (30 Oct 2021 05:07) (95% - 96%)    MEDICATIONS  (STANDING):  acetaminophen     Tablet .. 975 milliGRAM(s) Oral <User Schedule>  ascorbic acid 500 milliGRAM(s) Oral three times a day  diphtheria/tetanus/pertussis (acellular) Vaccine (ADAcel) 0.5 milliLiter(s) IntraMuscular once  ferrous    sulfate 325 milliGRAM(s) Oral three times a day  heparin   Injectable 5000 Unit(s) SubCutaneous every 12 hours  ibuprofen  Tablet. 600 milliGRAM(s) Oral every 6 hours  influenza   Vaccine 0.5 milliLiter(s) IntraMuscular once  lactated ringers. 1000 milliLiter(s) (125 mL/Hr) IV Continuous <Continuous>  oxytocin Infusion 333.333 milliUNIT(s)/Min (1000 mL/Hr) IV Continuous <Continuous>      MEDICATIONS  (PRN):  diphenhydrAMINE 25 milliGRAM(s) Oral every 6 hours PRN Pruritus  lanolin Ointment 1 Application(s) Topical every 6 hours PRN Sore Nipples  magnesium hydroxide Suspension 30 milliLiter(s) Oral two times a day PRN Constipation  oxyCODONE    IR 5 milliGRAM(s) Oral every 3 hours PRN Moderate to Severe Pain (4-10)  oxyCODONE    IR 5 milliGRAM(s) Oral once PRN Moderate to Severe Pain (4-10)  simethicone 80 milliGRAM(s) Chew every 4 hours PRN Gas      Labs:  Blood type: A Positive  Rubella IgG: RPR: Negative                          8.2<L>   14.93<H> >-----------< 174    ( 10-29 @ 07:37 )             26.4<L>        PE:  General: NAD  Abdomen: Incision c/d/i. Appropriately tender diffusely. Soft abdomen   Extremities: No calf tenderness bilaterally.

## 2021-10-30 NOTE — DISCHARGE NOTE OB - MEDICATION SUMMARY - MEDICATIONS TO TAKE
I will START or STAY ON the medications listed below when I get home from the hospital:    ibuprofen 600 mg oral tablet  -- 1 tab(s) by mouth every 6 hours  -- Indication: For pain    acetaminophen 325 mg oral tablet  -- 3 tab(s) by mouth   -- Indication: For pain    oxyCODONE 5 mg oral tablet  -- 1 tab(s) by mouth every 3 hours, As needed, Moderate to Severe Pain (4-10)  -- Indication: For pain    lanolin topical ointment  -- 1 application on skin every 6 hours, As needed, Sore Nipples  -- Indication: For nipple soreness    PNV Prenatal oral tablet  -- 1 tab(s) by mouth once a day  -- Indication: For pnv    ferrous sulfate 325 mg (65 mg elemental iron) oral tablet  -- 1 tab(s) by mouth 3 times a day  -- Indication: For anemia    ascorbic acid 500 mg oral tablet  -- 1 tab(s) by mouth 3 times a day  -- Indication: For anemia   I will START or STAY ON the medications listed below when I get home from the hospital:    ibuprofen 600 mg oral tablet  -- 1 tab(s) by mouth every 6 hours  -- Indication: For pain    acetaminophen 325 mg oral tablet  -- 3 tab(s) by mouth   -- Indication: For pain    oxyCODONE 5 mg oral tablet  -- 1 tab(s) by mouth every 6 hours, As Needed MDD:4 tablet   -- Caution federal law prohibits the transfer of this drug to any person other  than the person for whom it was prescribed.  It is very important that you take or use this exactly as directed.  Do not skip doses or discontinue unless directed by your doctor.  May cause drowsiness or dizziness.  This prescription cannot be refilled.  Using more of this medication than prescribed may cause serious breathing problems.    -- Indication: For pain    lanolin topical ointment  -- 1 application on skin every 6 hours, As needed, Sore Nipples  -- Indication: For  delivery delivered    PNV Prenatal oral tablet  -- 1 tab(s) by mouth once a day  -- Indication: For pnv    ferrous sulfate 325 mg (65 mg elemental iron) oral tablet  -- 1 tab(s) by mouth 3 times a day  -- Indication: For anemia    senna 15 mg oral tablet  -- 2 tab(s) by mouth once a day, As Needed   -- Indication: For Constipation    ascorbic acid 500 mg oral tablet  -- 1 tab(s) by mouth 3 times a day  -- Indication: For anemia

## 2021-10-30 NOTE — DISCHARGE NOTE OB - CARE PLAN
1 Principal Discharge DX:	 delivery delivered  Assessment and plan of treatment:	incision check in 2 weeks, pelvic rest x 6 weeks

## 2021-10-30 NOTE — DISCHARGE NOTE OB - CARE PROVIDER_API CALL
Joe Maldonado)  Obstetrics and Gynecology  [address] [place] [address], [address]  [place] [address]  Phone: [phone]  Fax: [phone]  Follow Up Time:

## 2021-10-30 NOTE — DISCHARGE NOTE OB - MATERIALS PROVIDED
[facility] [place] Screening Program/[place]  Immunization Record/Guide to Postpartum Care/Breastfeeding Guide and Packet/Discharge Medication Information for Patients and Families Pocket Guide

## 2021-10-30 NOTE — DISCHARGE NOTE OB - PATIENT PORTAL LINK FT
You can access the FollowMyHealth Patient Portal offered by [facility] by registering at the following website: [email or web address]. By joining [facility]’s FollowMyHealth portal, you will also be able to view your health information using other applications (apps) compatible with our system.

## 2021-10-31 VITALS
SYSTOLIC BLOOD PRESSURE: 103 MMHG | DIASTOLIC BLOOD PRESSURE: 67 MMHG | TEMPERATURE: 98 F | OXYGEN SATURATION: 99 % | RESPIRATION RATE: 18 BRPM | HEART RATE: 68 BPM

## 2021-10-31 PROCEDURE — 59050 FETAL MONITOR W/REPORT: CPT

## 2021-10-31 PROCEDURE — 86900 BLOOD TYPING SEROLOGIC ABO: CPT

## 2021-10-31 PROCEDURE — 85025 COMPLETE CBC W/AUTO DIFF WBC: CPT

## 2021-10-31 PROCEDURE — 86850 RBC ANTIBODY SCREEN: CPT

## 2021-10-31 PROCEDURE — 86769 SARS-COV-2 COVID-19 ANTIBODY: CPT

## 2021-10-31 PROCEDURE — 36415 COLL VENOUS BLD VENIPUNCTURE: CPT

## 2021-10-31 PROCEDURE — 86901 BLOOD TYPING SEROLOGIC RH(D): CPT

## 2021-10-31 PROCEDURE — 59025 FETAL NON-STRESS TEST: CPT

## 2021-10-31 PROCEDURE — C1889: CPT

## 2021-10-31 PROCEDURE — 86780 TREPONEMA PALLIDUM: CPT

## 2021-10-31 PROCEDURE — 88305 TISSUE EXAM BY PATHOLOGIST: CPT

## 2021-10-31 RX ORDER — SENNA PLUS 8.6 MG/1
2 TABLET ORAL
Qty: 28 | Refills: 0
Start: 2021-10-31 | End: 2021-11-13

## 2021-10-31 RX ORDER — OXYCODONE HYDROCHLORIDE 5 MG/1
1 TABLET ORAL
Qty: 10 | Refills: 0
Start: 2021-10-31

## 2021-10-31 RX ADMIN — Medication 600 MILLIGRAM(S): at 00:12

## 2021-10-31 RX ADMIN — Medication 975 MILLIGRAM(S): at 03:25

## 2021-10-31 RX ADMIN — Medication 600 MILLIGRAM(S): at 06:17

## 2021-10-31 RX ADMIN — Medication 325 MILLIGRAM(S): at 05:21

## 2021-10-31 RX ADMIN — Medication 600 MILLIGRAM(S): at 12:38

## 2021-10-31 RX ADMIN — Medication 975 MILLIGRAM(S): at 08:42

## 2021-10-31 RX ADMIN — Medication 975 MILLIGRAM(S): at 09:15

## 2021-10-31 RX ADMIN — Medication 500 MILLIGRAM(S): at 05:20

## 2021-10-31 RX ADMIN — Medication 600 MILLIGRAM(S): at 05:21

## 2021-10-31 RX ADMIN — HEPARIN SODIUM 5000 UNIT(S): 5000 INJECTION INTRAVENOUS; SUBCUTANEOUS at 08:43

## 2021-10-31 RX ADMIN — Medication 975 MILLIGRAM(S): at 02:48

## 2021-10-31 RX ADMIN — Medication 600 MILLIGRAM(S): at 13:10

## 2021-10-31 NOTE — PROGRESS NOTE ADULT - ASSESSMENT
A/P: 31y POD#2 s/p LTCS.  Patient is stable and doing well post-operatively.    - Continue regular diet.  - Increase ambulation.  - Continue motrin, tylenol, oxycodone PRN for pain control. Encouraged to take as needed     Miky Ardon, PGY-1  Obstetrics and Gynecology
A/P: 30yo POD#1 s/p LTCS.  Patient is stable and doing well post-operatively.    - Continue regular diet.  - Increase ambulation.  - Continue motrin, tylenol, oxycodone PRN for pain control  - F/u AM CBC    DENI Crawley MD  PGY-1 
A/P: 30yo POD#3 s/p LTCS and b/l ovarian cystectomy. Patient is stable and is doing well post-operatively.  - Continue motrin, tylenol, oxycodone PRN for pain control.  - Increase ambulation  - Continue regular diet  - Discharge planning    Miky Ardon, PGY-1  Obstetrics and Gynecology

## 2021-10-31 NOTE — PROGRESS NOTE ADULT - ATTENDING COMMENTS
Pt evaluated at bedside. Pt doing well. POD#3 s/p pltcs and bilateral ovarian cystectomy. all post-op milestones met. +flatus. Incision c/d/i. abd soft, ND, appropriately tender. Lochia wnl. pt to be discharged today  RShibata
OB Attg:  Pt seen and assessed. I agree with above assessment and plan.  LILIANE Maldonado MD
OB Attg:  Pt seen and examined. I agree with above assessment and plan.  LILIANE Maldonado MD

## 2021-10-31 NOTE — PROGRESS NOTE ADULT - SUBJECTIVE AND OBJECTIVE BOX
OB Postpartum Note:  Delivery, POD#3    S: 32yo POD#3 s/p LTCS and b/l ovarian cystectomy. The patient feels well.  Pain is controlled, improved compared to prior. She is tolerating a regular diet and passing flatus. She is voiding spontaneously, and ambulating without difficulty. Denies CP/SOB. Denies lightheadedness/dizziness. Denies N/V.    O:  Vitals:  Vital Signs Last 24 Hrs  T(C): 36.7 (30 Oct 2021 20:40), Max: 36.9 (30 Oct 2021 05:07)  T(F): 98.1 (30 Oct 2021 20:40), Max: 98.5 (30 Oct 2021 17:01)  HR: 66 (30 Oct 2021 20:40) (66 - 92)  BP: 96/68 (30 Oct 2021 20:40) (95/61 - 100/68)  BP(mean): --  RR: 18 (30 Oct 2021 20:40) (18 - 18)  SpO2: 96% (30 Oct 2021 20:40) (95% - 96%)    MEDICATIONS  (STANDING):  acetaminophen     Tablet .. 975 milliGRAM(s) Oral <User Schedule>  ascorbic acid 500 milliGRAM(s) Oral three times a day  diphtheria/tetanus/pertussis (acellular) Vaccine (ADAcel) 0.5 milliLiter(s) IntraMuscular once  ferrous    sulfate 325 milliGRAM(s) Oral three times a day  heparin   Injectable 5000 Unit(s) SubCutaneous every 12 hours  ibuprofen  Tablet. 600 milliGRAM(s) Oral every 6 hours  influenza   Vaccine 0.5 milliLiter(s) IntraMuscular once  lactated ringers. 1000 milliLiter(s) (125 mL/Hr) IV Continuous <Continuous>  oxytocin Infusion 333.333 milliUNIT(s)/Min (1000 mL/Hr) IV Continuous <Continuous>    MEDICATIONS  (PRN):  diphenhydrAMINE 25 milliGRAM(s) Oral every 6 hours PRN Pruritus  lanolin Ointment 1 Application(s) Topical every 6 hours PRN Sore Nipples  magnesium hydroxide Suspension 30 milliLiter(s) Oral two times a day PRN Constipation  oxyCODONE    IR 5 milliGRAM(s) Oral every 3 hours PRN Moderate to Severe Pain (4-10)  oxyCODONE    IR 5 milliGRAM(s) Oral once PRN Moderate to Severe Pain (4-10)  simethicone 80 milliGRAM(s) Chew every 4 hours PRN Gas      LABS:  Blood type: A Positive  Rubella IgG: RPR: Negative                          8.2<L>   14.93<H> >-----------< 174    ( 10-29 @ 07:37 )             26.4<L>            Physical exam:  Gen: NAD  Abdomen: Soft, nontender, no distension , firm uterine fundus at umbilicus.  Incision: Clean, dry, and intact   Ext: No calf tenderness bilaterally

## 2021-11-15 ENCOUNTER — APPOINTMENT (OUTPATIENT)
Dept: OBGYN | Facility: CLINIC | Age: 31
End: 2021-11-15
Payer: COMMERCIAL

## 2021-11-15 PROCEDURE — 0503F POSTPARTUM CARE VISIT: CPT

## 2021-11-29 LAB — SURGICAL PATHOLOGY STUDY: SIGNIFICANT CHANGE UP

## 2021-12-13 ENCOUNTER — APPOINTMENT (OUTPATIENT)
Dept: OBGYN | Facility: CLINIC | Age: 31
End: 2021-12-13
Payer: COMMERCIAL

## 2021-12-13 PROCEDURE — 0503F POSTPARTUM CARE VISIT: CPT

## 2022-03-28 ENCOUNTER — APPOINTMENT (OUTPATIENT)
Dept: OBGYN | Facility: CLINIC | Age: 32
End: 2022-03-28
Payer: COMMERCIAL

## 2022-03-28 VITALS
WEIGHT: 130 LBS | BODY MASS INDEX: 23.04 KG/M2 | SYSTOLIC BLOOD PRESSURE: 104 MMHG | HEIGHT: 63 IN | DIASTOLIC BLOOD PRESSURE: 66 MMHG

## 2022-03-28 DIAGNOSIS — Z87.42 PERSONAL HISTORY OF OTHER DISEASES OF THE FEMALE GENITAL TRACT: ICD-10-CM

## 2022-03-28 DIAGNOSIS — N83.201 UNSPECIFIED OVARIAN CYST, RIGHT SIDE: ICD-10-CM

## 2022-03-28 DIAGNOSIS — N92.0 EXCESSIVE AND FREQUENT MENSTRUATION WITH REGULAR CYCLE: ICD-10-CM

## 2022-03-28 DIAGNOSIS — O46.90 ANTEPARTUM HEMORRHAGE, UNSPECIFIED, UNSPECIFIED TRIMESTER: ICD-10-CM

## 2022-03-28 DIAGNOSIS — Z30.09 ENCOUNTER FOR OTHER GENERAL COUNSELING AND ADVICE ON CONTRACEPTION: ICD-10-CM

## 2022-03-28 DIAGNOSIS — Z30.45 ENCOUNTER FOR SURVEILLANCE OF TRANSDERMAL PATCH HORMONAL CONTRACEPTIVE DEVICE: ICD-10-CM

## 2022-03-28 DIAGNOSIS — Z34.90 ENCOUNTER FOR SUPERVISION OF NORMAL PREGNANCY, UNSPECIFIED, UNSPECIFIED TRIMESTER: ICD-10-CM

## 2022-03-28 DIAGNOSIS — Z34.92 ENCOUNTER FOR SUPERVISION OF NORMAL PREGNANCY, UNSPECIFIED, SECOND TRIMESTER: ICD-10-CM

## 2022-03-28 PROCEDURE — 99214 OFFICE O/P EST MOD 30 MIN: CPT

## 2022-03-29 PROBLEM — Z30.09 GENERAL COUNSELING AND ADVICE FOR CONTRACEPTIVE MANAGEMENT: Status: ACTIVE | Noted: 2022-03-29

## 2022-03-29 PROBLEM — Z34.92 ENCOUNTER FOR PREGNANCY RELATED EXAMINATION, SECOND TRIMESTER: Status: RESOLVED | Noted: 2021-04-22 | Resolved: 2022-03-29

## 2022-03-29 PROBLEM — Z30.45 CONTRACEPTIVE PATCH STATUS: Status: ACTIVE | Noted: 2022-03-29

## 2022-03-29 PROBLEM — O46.90 VAGINAL BLEEDING IN PREGNANCY: Status: RESOLVED | Noted: 2021-05-24 | Resolved: 2022-03-29

## 2022-03-29 PROBLEM — Z34.90 GRAVIDA 2, CURRENTLY PREGNANT: Status: RESOLVED | Noted: 2021-09-29 | Resolved: 2022-03-29

## 2022-03-29 PROBLEM — Z87.42 HISTORY OF OVARIAN CYST: Status: RESOLVED | Noted: 2022-03-29 | Resolved: 2022-03-29

## 2022-03-29 PROBLEM — N92.0 HEAVY MENSTRUAL BLEEDING: Status: ACTIVE | Noted: 2022-03-29

## 2022-03-29 RX ORDER — NORELGESTROMIN AND ETHINYL ESTRADIOL 150; 35 UG/D; UG/D
150-35 PATCH TRANSDERMAL
Qty: 9 | Refills: 1 | Status: ACTIVE | COMMUNITY
Start: 2022-03-29 | End: 1900-01-01

## 2022-03-29 NOTE — HISTORY OF PRESENT ILLNESS
[FreeTextEntry1] : 32 YO   presents to discuss contraception and monitor ovaries. Pt had a C/S 10/2021. Pt had a bilateral cystectomy at the time of C/S. 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  Pt complains her periods have been heavy since delivery.\par \par  [TextBox_4] : Pt c/o heavy bleeding and wishes to discuss birth control options [PapSmeardate] : 2021 [LMPDate] : 3/26/22

## 2022-03-29 NOTE — PLAN
[FreeTextEntry1] : 30 YO  presents to discuss contraception and monitor ovaries. \par - Discussed the risks and benefits of NuvaRing, IUD, and OCP Method of contraception and tranexamic acid. Mirena iud can increase risk of ovarian cyst formation  \par - TVUS done to monitor ovaries \par - Repeat pelvic ultrasounds in 6 weeks to follow up R ovarian cyst \par - Rx for Xulane, safe and effective use discussed

## 2022-03-29 NOTE — PROCEDURE
[Transvaginal OB Sonogram] : Transvaginal OB Sonogram [FreeTextEntry1] : Pt has spectated cyst on left ovary 5.4 cm x 3.19 cm . Also noted a 1.4 cm on right ovary [Transvaginal Ultrasound] : transvaginal ultrasound [FreeTextEntry3] : Pt has spectated cyst on left ovary 5.4 cm x 3.19 cm . Also noted a 1.4 cm on right ovary

## 2022-07-21 NOTE — OB PST NOTE - NSANTHGENDERRD_ENT_A_CORE
Pediatric ENT       Attending Addendum  I agree with the above documentation. I examined the patient and discussed care with the team and the family.  The details of the encounter have been transcribed by the APC team.  I formulated the plan as described above.     Now status post sleep study.  Did okay, underventilated, large leak.  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  After long conversation with the parents, we have decided to proceed during this hospitalization.  Reasoning is that the patient has a large leak, and the defect is sizable.    Xavi Giles M.D.  Pediatric Otolaryngology,  [facility])  office  [phone]  fax      [phone]  pager  [phone]        [facility] ENT Progress Note    Subjective: Keisha Mi is a 3 year old female with a PMH of congenital central hypoventilation syndrome requiring tracheostomy and ventilator, and dysphagia requiring G-tube.  Has done well with capping trials the past few months and sleeping with CPAP mask at night.     She was admitted to PICU on 7/10 for a sleep study with a capped trach and CPAP. Patient's trach was downsized from a 4.5 uncuffed Peds Bivona trach tube to a 3.5 Peds Bivona tube for the sleep study. Patient was observed overnight and continued to do well with capping trial.    Patient is now POD# 9 s/p tracheostomy tube decannulation/tracheostomy site closure with Dr. Giles and gastrocutaneous fistula closure with General Surgery team on 7/12/22. See operative note for complete details.     Interval History:  No acute events overnight. Patient underwent repeat sleep study last night. AHI of 10.0/hr. Patient still has open area of dehiscence with + air leak on the left side of her incision. Minimal serosanguinous on her dressing, small amount of thick white mucous. Patient tested + for covid today.     REVIEW OF SYSTEMS:  12 point review of systems completed and all negative, except as  noted in HPI.    PHYSICAL EXAM:  Visit Vitals  BP (!) 109/87 (BP Location: RLE - Right lower extremity, Patient Position: Sitting)   Pulse 125   Temp 98.2 °F (36.8 °C) (Axillary)   Resp (!) 15   Ht 3' 6.52\" (1.08 m)   Wt 16.7 kg (36 lb 13.1 oz)   SpO2 98%   BMI 15.26 kg/m²     General: Awake, NAD  HENT: Oral mucosa is moist, normocephalic, nares patent, palate intact.  Neck: Supple, surgical incision with no erythema, no crepitus or edema around surgical site, surgical site with dehiscence from middle to left edge. Small amount of serosanguinous drainage/mucous on dressing.    Respiratory: Respirations non-labored, Symmetrical chest wall expansion. No stridor or stertor.    Cardiovascular: Normal peripheral perfusion, No edema.     Gastrointestinal: Soft, non-distended, surgical dressing in place.    Musculoskeletal: Normal range of motion     Integumentary: Warm, Dry, [place]     XR CHEST AP OR PA 1 VIEW  Narrative: EXAMINATION: XR CHEST PA OR AP 1 VIEW      EXAM DATE: 7/21/2022 11:25 AM    CLINICAL HISTORY: 3 years Female  requiring oxygen     COMPARISON: July 18, 2022      FINDINGS:    Lines, tubes, and devices:  Interval extubation and removal of enteric  catheter..    Cardiomediastinal silhouette:  Normal sized cardiothymic silhouette.    Lungs and pleura: Confluent right basilar opacity is new or increased..    No discrete pleural effusion. No pneumothorax.   Impression: New confluent right basilar opacity favors atelectasis. Pneumonia is less  likely, not excluded.    Electronically Signed by: ISMAEL MIEER M.D.   Signed on: 7/21/2022 2:10 PM       Recent Labs   Lab 07/17/22  1839   WBC 5.7*   HCT 34.1   HGB 11.6        Assessment/Plan:  3-year-old female PMH of congenital central hypoventilation who is now POD# 9 s/p tracheostomy tube decannulation/tracheostomy site closure with Dr. Giles and gastrocutaneous fistula closure with General Surgery team on 7/12/22. See operative report for further  details.     Patient is well on exam. Surgical site still with area of dehiscense from the left side, + air leak. Pressure dressing changed. Small amount of serosanguinous drainage/mucous on noted gauze dressing. Plan is for patient to go back to the OR with Dr. Giles tomorrow (7/22) for revision of tracheocutaneous fistula closure.      Recommendations:  - Medical management per PICU  - OR tomorrow with Dr. Giles for tracheocutaneous fistula closure (7:30a)  - NPO after 11p  - Pressure dressing (Kerlex fluff and stockinette), ok for nursing staff to change if soiled.    - Peds Pulm on consult - defer settings to Pulm team  - Tylenol and Motrin as needed for pain  - Peds Gen Surgery on consult      ENT following. Please call for questions or concerns.  Plan of care discussed with Dr. Giles, PICU team, & family at bedside.      Sincerely,    Ada Quiroga DNP, CPNP-AC/PC  Pediatric Nurse Pracitioner,           No

## 2022-08-16 NOTE — OB RN DELIVERY SUMMARY - NSDELIVERYTYPEA_OBGYN_ALL_OB
Pt. Contacted. Procedure and follow up scheduled. Educated on pre-procedure instructions, also mailed. Verbalized understanding. Med.  Clearance 7/12/2022-10/12/2022  Delivery

## 2022-10-21 NOTE — OB PROVIDER TRIAGE NOTE - ADDITIONAL INSTRUCTIONS
Chief Complaint   Patient presents with    Other     Patient is here to discuss CPAP trial.       HPI:  Kesha Russo is a 37 y.o. (: 1979) here today to discuss CPAP trial.  HPI  Was doing cpap trial.  Had problem w/ mask. Changed mask. Then issue w/ filter. Then got that fixed. Needs new cpap trial.  Has been getting done w/ libby. Had sleep study at [facility]. Seen by Dr Sabino Chang. Diff w/ mask primarily. When was able to use cpap, better energy during the day. Moisturization helped nasal congestion. Has had some chest congestion w/ lying down. Cough, sputum production. Thick, clear mucus. No fevers noted. Bp has been fluctuating somewhat. No other ill contacts noted. Still using inhalers. Some ear issues as well. Patient's medications, allergies, past medical, surgical, social and family histories were reviewed and updated as appropriate. ROS:  Review of Systems   Constitutional:  Negative for fever. HENT:  Positive for congestion and ear pain. Respiratory:  Positive for cough and shortness of breath. Psychiatric/Behavioral:  Positive for sleep disturbance.           Microscopic Examination (no units)   Date Value   2019 YES     LDL Calculated (mg/dL)   Date Value   2022 123 (H)       Past Medical History:   Diagnosis Date    Arthritis     Asthma     Bipolar 1 disorder (HCC)     Chronic constipation     COPD (chronic obstructive pulmonary disease) ([facility])     CRPS (complex regional pain syndrome type II)     Dental abscess     Essential hypertension 2021    GERD (gastroesophageal reflux disease)     Hx of blood clots     hx of pos d dimer    Low back pain     Mixed hyperlipidemia 2021    Orthostatic hypotension     Osteoporosis     Peptic ulcer disease     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 ([facility] [address]     Psychosis ([facility] [address]     PTSD (post-traumatic stress disorder)        Family History   Problem Relation Age of Onset    Cancer Mother     Cancer Father     Cancer Brother     Heart Disease Brother        Social History     Socioeconomic History    Marital status:      Spouse name: Not on file    Number of children: Not on file    Years of education: Not on file    Highest education level: Not on file   Occupational History    Not on file   Tobacco Use    Smoking status: Every Day     Packs/day: 0.50     Types: Cigarettes     Start date: 1/28/1994    Smokeless tobacco: Never    Tobacco comments:     Currently trying to quit   Vaping Use    Vaping Use: Never used   Substance and Sexual Activity    Alcohol use: No    Drug use: Not Currently     Types: Marijuana Eliseo Hail)    Sexual activity: Not Currently   Other Topics Concern    Not on file   Social History Narrative    Not on file     Social Determinants of Health     Financial Resource Strain: Not on file   Food Insecurity: No Food Insecurity    Worried About Running Out of Food in the Last Year: Never true    Ran Out of Food in the Last Year: Never true   Transportation Needs: Unmet Transportation Needs    Lack of Transportation (Medical): Yes    Lack of Transportation (Non-Medical): Not on file   Physical Activity: Not on file   Stress: Not on file   Social Connections: Not on file   Intimate Partner Violence: Not on file   Housing Stability: Not on file       Prior to Visit Medications    Medication Sig Taking?  Authorizing Provider   promethazine (PHENERGAN) 25 MG tablet Take 1 tablet by mouth every 8 hours as needed for Nausea Yes Sohan Mayer MD   predniSONE (DELTASONE) 20 MG tablet Take 2 tablets by mouth daily for 5 days Yes Sohan Mayer MD   cefdinir (OMNICEF) 300 MG capsule Take 1 capsule by mouth 2 times daily for 7 days Yes Sohan Mayer MD   aspirin 81 MG EC tablet Take 1 tablet by mouth daily Yes Sohan Mayer MD   carvedilol (COREG) 6.25 MG tablet Take 1 tablet by mouth 2 times daily Yes Sohan Mayer MD   levocetirizine (XYZAL) 5 MG tablet Take 1 tablet by mouth nightly Yes Shaq Arcos MD   atorvastatin (LIPITOR) 10 MG tablet Take 1 tablet by mouth daily Yes Shaq Arcos MD   omeprazole (PRILOSEC) 40 MG delayed release capsule Take 1 capsule by mouth 2 times daily Yes Shaq Arcos MD   albuterol (PROVENTIL) (2.5 MG/3ML) 0.083% nebulizer solution Take 3 mLs by nebulization every 4 hours as needed for Wheezing Yes Shaq Arcos MD   lamoTRIgine (LAMICTAL) 25 MG tablet Take 2 tablets by mouth daily Yes Shaq Arcos MD   tiotropium (SPIRIVA RESPIMAT) 2.5 MCG/ACT AERS inhaler INHALE 2 PUFFS DAILY Yes Shaq Arcos MD   fluticasone (FLONASE) 50 MCG/ACT nasal spray 1 spray by Each Nostril route daily Yes Shaq Arcos MD   albuterol sulfate HFA (PROVENTIL;VENTOLIN;PROAIR) 108 (90 Base) MCG/ACT inhaler Inhale 2 puffs into the lungs every 6 hours as needed for Shortness of Breath Yes Shaq Arcos MD   furosemide (LASIX) 40 MG tablet Take 1 tablet by mouth daily Yes Shaq Arcos MD   docusate sodium (COLACE) 100 MG capsule TAKE 1 CAPSULE BY MOUTH TWICE A DAY AS NEEDED Yes Shaq Arcos MD   linaclotide (LINZESS) 290 MCG CAPS capsule TAKE 1 CAPSULE BY MOUTH DAILY ON A EMPTY STOMACH AT LEAST 30 MINUTES BEFORE 1ST MEAL Yes Shaq Arcos MD   budesonide-formoterol (SYMBICORT) 160-4.5 MCG/ACT AERO Inhale 2 puffs into the lungs 2 times daily Yes Shaq Arcos MD   LORazepam (ATIVAN) 2 MG tablet Take 2 mg by mouth in the morning and 2 mg at noon and 2 mg before bedtime. Yes Historical Provider, MD   Probiotic Product (DIGESTIVE ADVANTAGE PO) Take by mouth Yes Historical Provider, MD   APPLE CIDER VINEGAR PO Take by mouth Yes Historical Provider, MD   pantoprazole sodium (PROTONIX) 40 MG PACK packet Take 40 mg by mouth daily Yes Historical Provider, MD   gabapentin (NEURONTIN) 300 MG capsule Take 600 mg by mouth 3 times daily.   Yes Historical Provider, MD   QUEtiapine (SEROQUEL) 25 MG tablet Take 25 mg by mouth daily as needed Yes Historical Provider, MD   OXYGEN Inhale 2 L into the lungs  Yes Historical Provider, MD   paliperidone (INVEGA) 9 MG extended release tablet Take 9 mg by mouth every morning Yes Historical Provider, MD   escitalopram (LEXAPRO) 20 MG tablet Take 20 mg by mouth daily Yes Historical Provider, MD   imiquimod (ALDARA) 5 % cream APPLY 1 PACKET TOPICALLY TO AFFECTED AREA ON BODY THREE TIMES WEEKLY AT BEDTIME AND LEAVE ON FOR 8 HOURS, THEN [address] OFF AS DIRECTED BY PRESCRIBER  Brenda Perez, APRN - CNP       Allergies   Allergen Reactions    Clindamycin/Lincomycin Other (See Comments), Hives, Itching and Shortness Of Breath     Weakness, and dizziness  Eye and throat swelling       Penicillins Swelling and Other (See Comments)     Other reaction(s): anaphylaxis/angioedema, anaphylaxis/angioedema  Throat swelling  Eye and throat swelling   Eye and throat swelling      Amoxicillin     Citalopram Hydrobromide      She thinks it made her bp to elevate    Clavulanic Acid Nausea And Vomiting       OBJECTIVE:    /70   Pulse 96   Wt 197 lb 3.2 oz (89.4 kg)   [facility] 12/18/2013   SpO2 97%   BMI 33.85 kg/m²     BP Readings from Last 2 Encounters:   10/21/22 112/70   09/12/22 110/78       Wt Readings from Last 3 Encounters:   10/21/22 197 lb 3.2 oz (89.4 kg)   09/12/22 196 lb 12.8 oz (89.3 kg)   09/02/22 199 lb 6.4 oz (90.4 kg)       Physical Exam  Constitutional:       Appearance: She is well-developed. HENT:      Head: Normocephalic and atraumatic. Right Ear: Tympanic membrane and external ear normal.      Left Ear: External ear normal. Tympanic membrane is erythematous. Nose:      Right Sinus: Maxillary sinus tenderness present. No frontal sinus tenderness. Left Sinus: Maxillary sinus tenderness present. No frontal sinus tenderness. Eyes:      Conjunctiva/sclera: Conjunctivae normal.   Neck:      Trachea: No tracheal deviation. Cardiovascular:      Rate and Rhythm: Normal rate and regular rhythm. Heart sounds: Normal heart sounds. Pulmonary:      Effort: Pulmonary effort is normal.      Breath sounds: Normal breath sounds. Abdominal:      Palpations: Abdomen is soft. Tenderness: There is no abdominal tenderness. Skin:     General: Skin is warm and dry. Neurological:      Mental Status: She is alert and oriented to person, place, and time. Psychiatric:         Mood and Affect: Mood normal.         Behavior: Behavior normal.         ASSESSMENT/PLAN:    1. COPD exacerbation (HCC)  Mild exacerbation. Cont inhalers/ nebs. Meds as below. Call if fails to improve    2. Acute bacterial sinusitis  See above. Mild sinus tenderness noted. - predniSONE (DELTASONE) 20 MG tablet; Take 2 tablets by mouth daily for 5 days  Dispense: 10 tablet; Refill: 0  - cefdinir (OMNICEF) 300 MG capsule; Take 1 capsule by mouth 2 times daily for 7 days  Dispense: 14 capsule; Refill: 0    3. Left ear pain  ? Early otitis. Meds as above. Call if fails to improve    4. RUTH (obstructive sleep apnea)  Reviewed note from pulm/ sleep specialist.  Apparently needs new cpap trial.  Given difficulty w/ getting the approp mask, filter, etc, would benefit from extension of cpap trial period do make sure she gets the most benefit. Pt intends to use cpap. She has already seen some benefit during prior trials, but issues w/ mask as documented above. Refill prn nausea med. Discussed findings with Dr. Ramirez. Pt. d/c'd home. Pt. to follow up with next OB appointment. Pt. instructed to return to triage with increase abdominal cramping, leakage of fluid, vaginal bleeding, or decrease fetal movement.

## 2022-11-16 NOTE — DISCHARGE NOTE OB - SEVERE ABDOMINAL/VAGINAL AND/OR RECTAL PAIN
CONSTITUTIONAL: Patient is awake, alert and oriented x 3. Patient is well appearing and in no acute distress  HEAD: NCAT  NECK: supple, FROM  LUNGS: CTA b/l, no wheezing or rales   HEART: RRR.+S1S2 no murmurs  ABDOMEN: Soft, non-distended, nttp, no rebound or guarding  EXTREMITY: no edema or calf tenderness b/l, FROM upper and lower ext b/l  SKIN: with no rash or lesions  NEURO: No focal deficits
Statement Selected

## 2022-12-07 ENCOUNTER — NON-APPOINTMENT (OUTPATIENT)
Age: 32
End: 2022-12-07

## 2022-12-07 RX ORDER — NORETHINDRONE ACETATE AND ETHINYL ESTRADIOL AND FERROUS FUMARATE 1MG-20(24)
1-20 KIT ORAL DAILY
Qty: 3 | Refills: 1 | Status: ACTIVE | COMMUNITY
Start: 2022-12-07 | End: 1900-01-01

## 2022-12-21 ENCOUNTER — APPOINTMENT (OUTPATIENT)
Dept: OBGYN | Facility: CLINIC | Age: 32
End: 2022-12-21

## 2023-01-01 NOTE — OB RN PATIENT PROFILE - PRO BLOOD TYPE INFANT
Mucous membranes moist and pink without lesions; alveolar ridge smooth and edentulous; lip, palate and uvula with acceptable anatomic shape; normal tongue, frenulum and cheek exam; mandible size acceptable.
A positive

## 2023-01-17 ENCOUNTER — APPOINTMENT (OUTPATIENT)
Dept: OBGYN | Facility: CLINIC | Age: 33
End: 2023-01-17

## 2023-02-27 NOTE — OB RN PATIENT PROFILE - NS_PRENATALSTART_OBGYN_ALL_OB
Bob Mcarthur Dr. our sonographer wanted to let you know that he added the extra measurements that you had asked for for this patient's echocardiogram. He said the updated echo with the measurements aren't in Epic but he gave me the report and scanned it to  for you to view under todays date of 2/27/2023.    Thank you,    Kisha   Started first trimester
